# Patient Record
Sex: MALE | Race: WHITE | NOT HISPANIC OR LATINO | URBAN - METROPOLITAN AREA
[De-identification: names, ages, dates, MRNs, and addresses within clinical notes are randomized per-mention and may not be internally consistent; named-entity substitution may affect disease eponyms.]

---

## 2017-11-04 ENCOUNTER — HOSPITAL ENCOUNTER (OUTPATIENT)
Dept: RADIOLOGY | Facility: MEDICAL CENTER | Age: 62
End: 2017-11-04
Attending: INTERNAL MEDICINE
Payer: COMMERCIAL

## 2017-11-04 DIAGNOSIS — I26.99 OTHER ACUTE PULMONARY EMBOLISM WITHOUT ACUTE COR PULMONALE (HCC): ICD-10-CM

## 2017-11-04 DIAGNOSIS — I82.4Y1 DVT, LOWER EXTREMITY, PROXIMAL, ACUTE, RIGHT (HCC): ICD-10-CM

## 2017-11-04 DIAGNOSIS — M79.606 PAIN OF LOWER EXTREMITY, UNSPECIFIED LATERALITY: ICD-10-CM

## 2017-11-04 PROCEDURE — 93971 EXTREMITY STUDY: CPT

## 2017-11-15 ENCOUNTER — HOSPITAL ENCOUNTER (OUTPATIENT)
Dept: LAB | Facility: MEDICAL CENTER | Age: 62
End: 2017-11-15
Attending: INTERNAL MEDICINE
Payer: COMMERCIAL

## 2017-11-15 LAB — DEPRECATED D DIMER PPP IA-ACNC: <200 NG/ML(D-DU)

## 2017-11-15 PROCEDURE — 85379 FIBRIN DEGRADATION QUANT: CPT

## 2017-11-15 PROCEDURE — 36415 COLL VENOUS BLD VENIPUNCTURE: CPT

## 2019-04-26 ENCOUNTER — HOSPITAL ENCOUNTER (INPATIENT)
Facility: MEDICAL CENTER | Age: 64
LOS: 1 days | DRG: 700 | End: 2019-04-28
Attending: EMERGENCY MEDICINE | Admitting: HOSPITALIST
Payer: MEDICARE

## 2019-04-26 ENCOUNTER — HOSPITAL ENCOUNTER (OUTPATIENT)
Dept: RADIOLOGY | Facility: MEDICAL CENTER | Age: 64
End: 2019-04-26

## 2019-04-26 DIAGNOSIS — N28.89 RENAL MASS: ICD-10-CM

## 2019-04-26 PROBLEM — E78.5 HLD (HYPERLIPIDEMIA): Status: ACTIVE | Noted: 2019-04-26

## 2019-04-26 LAB
ANION GAP SERPL CALC-SCNC: 6 MMOL/L (ref 0–11.9)
APPEARANCE UR: CLEAR
APTT PPP: 26.3 SEC (ref 24.7–36)
BASOPHILS # BLD AUTO: 0.2 % (ref 0–1.8)
BASOPHILS # BLD: 0.01 K/UL (ref 0–0.12)
BILIRUB UR QL STRIP.AUTO: NEGATIVE
BUN SERPL-MCNC: 19 MG/DL (ref 8–22)
CALCIUM SERPL-MCNC: 8.8 MG/DL (ref 8.5–10.5)
CHLORIDE SERPL-SCNC: 106 MMOL/L (ref 96–112)
CO2 SERPL-SCNC: 25 MMOL/L (ref 20–33)
COLOR UR: YELLOW
CREAT SERPL-MCNC: 1.1 MG/DL (ref 0.5–1.4)
EOSINOPHIL # BLD AUTO: 0.03 K/UL (ref 0–0.51)
EOSINOPHIL NFR BLD: 0.5 % (ref 0–6.9)
ERYTHROCYTE [DISTWIDTH] IN BLOOD BY AUTOMATED COUNT: 43.9 FL (ref 35.9–50)
GLUCOSE SERPL-MCNC: 120 MG/DL (ref 65–99)
GLUCOSE UR STRIP.AUTO-MCNC: NEGATIVE MG/DL
HCT VFR BLD AUTO: 39.6 % (ref 42–52)
HGB BLD-MCNC: 14 G/DL (ref 14–18)
IMM GRANULOCYTES # BLD AUTO: 0.01 K/UL (ref 0–0.11)
IMM GRANULOCYTES NFR BLD AUTO: 0.2 % (ref 0–0.9)
INR PPP: 1.07 (ref 0.87–1.13)
KETONES UR STRIP.AUTO-MCNC: NEGATIVE MG/DL
LEUKOCYTE ESTERASE UR QL STRIP.AUTO: NEGATIVE
LYMPHOCYTES # BLD AUTO: 0.65 K/UL (ref 1–4.8)
LYMPHOCYTES NFR BLD: 11.5 % (ref 22–41)
MCH RBC QN AUTO: 31.7 PG (ref 27–33)
MCHC RBC AUTO-ENTMCNC: 35.4 G/DL (ref 33.7–35.3)
MCV RBC AUTO: 89.6 FL (ref 81.4–97.8)
MICRO URNS: NORMAL
MONOCYTES # BLD AUTO: 0.58 K/UL (ref 0–0.85)
MONOCYTES NFR BLD AUTO: 10.3 % (ref 0–13.4)
NEUTROPHILS # BLD AUTO: 4.35 K/UL (ref 1.82–7.42)
NEUTROPHILS NFR BLD: 77.3 % (ref 44–72)
NITRITE UR QL STRIP.AUTO: NEGATIVE
NRBC # BLD AUTO: 0 K/UL
NRBC BLD-RTO: 0 /100 WBC
PH UR STRIP.AUTO: 7.5 [PH]
PLATELET # BLD AUTO: 93 K/UL (ref 164–446)
PMV BLD AUTO: 10.9 FL (ref 9–12.9)
POTASSIUM SERPL-SCNC: 3.8 MMOL/L (ref 3.6–5.5)
PROT UR QL STRIP: NEGATIVE MG/DL
PROTHROMBIN TIME: 14.1 SEC (ref 12–14.6)
RBC # BLD AUTO: 4.42 M/UL (ref 4.7–6.1)
RBC UR QL AUTO: NEGATIVE
SODIUM SERPL-SCNC: 137 MMOL/L (ref 135–145)
SP GR UR STRIP.AUTO: 1.02
UROBILINOGEN UR STRIP.AUTO-MCNC: 1 MG/DL
WBC # BLD AUTO: 5.6 K/UL (ref 4.8–10.8)

## 2019-04-26 PROCEDURE — 99220 PR INITIAL OBSERVATION CARE,LEVL III: CPT | Performed by: HOSPITALIST

## 2019-04-26 PROCEDURE — G0378 HOSPITAL OBSERVATION PER HR: HCPCS

## 2019-04-26 PROCEDURE — 94760 N-INVAS EAR/PLS OXIMETRY 1: CPT

## 2019-04-26 PROCEDURE — 99285 EMERGENCY DEPT VISIT HI MDM: CPT

## 2019-04-26 PROCEDURE — 85730 THROMBOPLASTIN TIME PARTIAL: CPT

## 2019-04-26 PROCEDURE — A9270 NON-COVERED ITEM OR SERVICE: HCPCS | Performed by: HOSPITALIST

## 2019-04-26 PROCEDURE — 700105 HCHG RX REV CODE 258: Performed by: HOSPITALIST

## 2019-04-26 PROCEDURE — 700111 HCHG RX REV CODE 636 W/ 250 OVERRIDE (IP): Performed by: HOSPITALIST

## 2019-04-26 PROCEDURE — 81003 URINALYSIS AUTO W/O SCOPE: CPT

## 2019-04-26 PROCEDURE — 85025 COMPLETE CBC W/AUTO DIFF WBC: CPT

## 2019-04-26 PROCEDURE — 85610 PROTHROMBIN TIME: CPT

## 2019-04-26 PROCEDURE — 700102 HCHG RX REV CODE 250 W/ 637 OVERRIDE(OP): Performed by: HOSPITALIST

## 2019-04-26 PROCEDURE — 96374 THER/PROPH/DIAG INJ IV PUSH: CPT

## 2019-04-26 PROCEDURE — 80048 BASIC METABOLIC PNL TOTAL CA: CPT

## 2019-04-26 RX ORDER — ALLOPURINOL 300 MG/1
300 TABLET ORAL EVERY MORNING
Status: DISCONTINUED | OUTPATIENT
Start: 2019-04-27 | End: 2019-04-28 | Stop reason: HOSPADM

## 2019-04-26 RX ORDER — MORPHINE SULFATE 4 MG/ML
4 INJECTION, SOLUTION INTRAMUSCULAR; INTRAVENOUS
Status: DISCONTINUED | OUTPATIENT
Start: 2019-04-26 | End: 2019-04-26

## 2019-04-26 RX ORDER — BISACODYL 10 MG
10 SUPPOSITORY, RECTAL RECTAL
Status: DISCONTINUED | OUTPATIENT
Start: 2019-04-26 | End: 2019-04-28 | Stop reason: HOSPADM

## 2019-04-26 RX ORDER — ALLOPURINOL 300 MG/1
300 TABLET ORAL EVERY MORNING
COMMUNITY
Start: 2017-06-06

## 2019-04-26 RX ORDER — SODIUM CHLORIDE 9 MG/ML
INJECTION, SOLUTION INTRAVENOUS CONTINUOUS
Status: DISCONTINUED | OUTPATIENT
Start: 2019-04-26 | End: 2019-04-28 | Stop reason: HOSPADM

## 2019-04-26 RX ORDER — ONDANSETRON 4 MG/1
4 TABLET, ORALLY DISINTEGRATING ORAL EVERY 4 HOURS PRN
Status: DISCONTINUED | OUTPATIENT
Start: 2019-04-26 | End: 2019-04-28 | Stop reason: HOSPADM

## 2019-04-26 RX ORDER — OXYCODONE HYDROCHLORIDE 10 MG/1
10 TABLET ORAL
Status: DISCONTINUED | OUTPATIENT
Start: 2019-04-26 | End: 2019-04-28 | Stop reason: HOSPADM

## 2019-04-26 RX ORDER — ONDANSETRON 2 MG/ML
4 INJECTION INTRAMUSCULAR; INTRAVENOUS EVERY 4 HOURS PRN
Status: DISCONTINUED | OUTPATIENT
Start: 2019-04-26 | End: 2019-04-28 | Stop reason: HOSPADM

## 2019-04-26 RX ORDER — OXYCODONE HYDROCHLORIDE 5 MG/1
5 TABLET ORAL
Status: DISCONTINUED | OUTPATIENT
Start: 2019-04-26 | End: 2019-04-28 | Stop reason: HOSPADM

## 2019-04-26 RX ORDER — SIMVASTATIN 10 MG
20 TABLET ORAL EVERY MORNING
Status: DISCONTINUED | OUTPATIENT
Start: 2019-04-27 | End: 2019-04-28 | Stop reason: HOSPADM

## 2019-04-26 RX ORDER — SIMVASTATIN 20 MG
20 TABLET ORAL EVERY MORNING
COMMUNITY
Start: 2017-06-10

## 2019-04-26 RX ORDER — AMOXICILLIN 250 MG
2 CAPSULE ORAL 2 TIMES DAILY
Status: DISCONTINUED | OUTPATIENT
Start: 2019-04-26 | End: 2019-04-28 | Stop reason: HOSPADM

## 2019-04-26 RX ORDER — POLYETHYLENE GLYCOL 3350 17 G/17G
1 POWDER, FOR SOLUTION ORAL
Status: DISCONTINUED | OUTPATIENT
Start: 2019-04-26 | End: 2019-04-28 | Stop reason: HOSPADM

## 2019-04-26 RX ADMIN — OXYCODONE HYDROCHLORIDE 5 MG: 5 TABLET ORAL at 23:02

## 2019-04-26 RX ADMIN — FENTANYL CITRATE 50 MCG: 50 INJECTION, SOLUTION INTRAMUSCULAR; INTRAVENOUS at 21:02

## 2019-04-26 RX ADMIN — SODIUM CHLORIDE: 9 INJECTION, SOLUTION INTRAVENOUS at 23:02

## 2019-04-26 ASSESSMENT — ENCOUNTER SYMPTOMS
MYALGIAS: 0
HALLUCINATIONS: 0
WEAKNESS: 0
BRUISES/BLEEDS EASILY: 0
ABDOMINAL PAIN: 0
HEMOPTYSIS: 0
FEVER: 0
HEARTBURN: 0
PALPITATIONS: 0
DOUBLE VISION: 0
FOCAL WEAKNESS: 0
SENSORY CHANGE: 0
DEPRESSION: 0
SHORTNESS OF BREATH: 0
FLANK PAIN: 1
EYE DISCHARGE: 0
NAUSEA: 0
COUGH: 0
BLURRED VISION: 0
SPEECH CHANGE: 0
CHILLS: 0
DIZZINESS: 0
VOMITING: 0

## 2019-04-26 ASSESSMENT — LIFESTYLE VARIABLES
HAVE YOU EVER FELT YOU SHOULD CUT DOWN ON YOUR DRINKING: NO
TOTAL SCORE: 0
CONSUMPTION TOTAL: NEGATIVE
TOTAL SCORE: 0
EVER HAD A DRINK FIRST THING IN THE MORNING TO STEADY YOUR NERVES TO GET RID OF A HANGOVER: NO
ALCOHOL_USE: YES
ON A TYPICAL DAY WHEN YOU DRINK ALCOHOL HOW MANY DRINKS DO YOU HAVE: 1
AVERAGE NUMBER OF DAYS PER WEEK YOU HAVE A DRINK CONTAINING ALCOHOL: 5
EVER_SMOKED: NEVER
EVER FELT BAD OR GUILTY ABOUT YOUR DRINKING: NO
HOW MANY TIMES IN THE PAST YEAR HAVE YOU HAD 5 OR MORE DRINKS IN A DAY: 0
SUBSTANCE_ABUSE: 0
HAVE PEOPLE ANNOYED YOU BY CRITICIZING YOUR DRINKING: NO
TOTAL SCORE: 0

## 2019-04-26 ASSESSMENT — PATIENT HEALTH QUESTIONNAIRE - PHQ9
2. FEELING DOWN, DEPRESSED, IRRITABLE, OR HOPELESS: NOT AT ALL
SUM OF ALL RESPONSES TO PHQ9 QUESTIONS 1 AND 2: 0
1. LITTLE INTEREST OR PLEASURE IN DOING THINGS: NOT AT ALL

## 2019-04-26 ASSESSMENT — COGNITIVE AND FUNCTIONAL STATUS - GENERAL
MOBILITY SCORE: 24
SUGGESTED CMS G CODE MODIFIER MOBILITY: CH
DAILY ACTIVITIY SCORE: 24
SUGGESTED CMS G CODE MODIFIER DAILY ACTIVITY: CH

## 2019-04-26 ASSESSMENT — PAIN SCALES - WONG BAKER: WONGBAKER_NUMERICALRESPONSE: HURTS A WHOLE LOT

## 2019-04-27 ENCOUNTER — APPOINTMENT (OUTPATIENT)
Dept: RADIOLOGY | Facility: MEDICAL CENTER | Age: 64
DRG: 700 | End: 2019-04-27
Attending: UROLOGY
Payer: MEDICARE

## 2019-04-27 PROBLEM — N18.9 CKD (CHRONIC KIDNEY DISEASE): Status: ACTIVE | Noted: 2019-04-27

## 2019-04-27 LAB
ALBUMIN SERPL BCP-MCNC: 3.3 G/DL (ref 3.2–4.9)
ALBUMIN/GLOB SERPL: 1.8 G/DL
ALP SERPL-CCNC: 58 U/L (ref 30–99)
ALT SERPL-CCNC: 8 U/L (ref 2–50)
ANION GAP SERPL CALC-SCNC: 6 MMOL/L (ref 0–11.9)
AST SERPL-CCNC: 9 U/L (ref 12–45)
BASOPHILS # BLD AUTO: 0.3 % (ref 0–1.8)
BASOPHILS # BLD: 0.02 K/UL (ref 0–0.12)
BILIRUB SERPL-MCNC: 1.5 MG/DL (ref 0.1–1.5)
BUN SERPL-MCNC: 18 MG/DL (ref 8–22)
CALCIUM SERPL-MCNC: 8.5 MG/DL (ref 8.5–10.5)
CHLORIDE SERPL-SCNC: 107 MMOL/L (ref 96–112)
CO2 SERPL-SCNC: 25 MMOL/L (ref 20–33)
CREAT SERPL-MCNC: 1.13 MG/DL (ref 0.5–1.4)
EOSINOPHIL # BLD AUTO: 0.05 K/UL (ref 0–0.51)
EOSINOPHIL NFR BLD: 0.9 % (ref 0–6.9)
ERYTHROCYTE [DISTWIDTH] IN BLOOD BY AUTOMATED COUNT: 44 FL (ref 35.9–50)
GLOBULIN SER CALC-MCNC: 1.8 G/DL (ref 1.9–3.5)
GLUCOSE SERPL-MCNC: 122 MG/DL (ref 65–99)
HCT VFR BLD AUTO: 38.4 % (ref 42–52)
HGB BLD-MCNC: 13.1 G/DL (ref 14–18)
IMM GRANULOCYTES # BLD AUTO: 0 K/UL (ref 0–0.11)
IMM GRANULOCYTES NFR BLD AUTO: 0 % (ref 0–0.9)
LYMPHOCYTES # BLD AUTO: 1.16 K/UL (ref 1–4.8)
LYMPHOCYTES NFR BLD: 19.7 % (ref 22–41)
MCH RBC QN AUTO: 30.5 PG (ref 27–33)
MCHC RBC AUTO-ENTMCNC: 34.1 G/DL (ref 33.7–35.3)
MCV RBC AUTO: 89.5 FL (ref 81.4–97.8)
MONOCYTES # BLD AUTO: 0.73 K/UL (ref 0–0.85)
MONOCYTES NFR BLD AUTO: 12.4 % (ref 0–13.4)
NEUTROPHILS # BLD AUTO: 3.92 K/UL (ref 1.82–7.42)
NEUTROPHILS NFR BLD: 66.7 % (ref 44–72)
NRBC # BLD AUTO: 0 K/UL
NRBC BLD-RTO: 0 /100 WBC
PLATELET # BLD AUTO: 97 K/UL (ref 164–446)
PMV BLD AUTO: 9.6 FL (ref 9–12.9)
POTASSIUM SERPL-SCNC: 4 MMOL/L (ref 3.6–5.5)
PROT SERPL-MCNC: 5.1 G/DL (ref 6–8.2)
RBC # BLD AUTO: 4.29 M/UL (ref 4.7–6.1)
SODIUM SERPL-SCNC: 138 MMOL/L (ref 135–145)
WBC # BLD AUTO: 5.9 K/UL (ref 4.8–10.8)

## 2019-04-27 PROCEDURE — 71250 CT THORAX DX C-: CPT

## 2019-04-27 PROCEDURE — 36415 COLL VENOUS BLD VENIPUNCTURE: CPT

## 2019-04-27 PROCEDURE — 700111 HCHG RX REV CODE 636 W/ 250 OVERRIDE (IP): Performed by: HOSPITALIST

## 2019-04-27 PROCEDURE — 96376 TX/PRO/DX INJ SAME DRUG ADON: CPT

## 2019-04-27 PROCEDURE — 700102 HCHG RX REV CODE 250 W/ 637 OVERRIDE(OP): Performed by: HOSPITALIST

## 2019-04-27 PROCEDURE — 700105 HCHG RX REV CODE 258: Performed by: HOSPITALIST

## 2019-04-27 PROCEDURE — G0378 HOSPITAL OBSERVATION PER HR: HCPCS

## 2019-04-27 PROCEDURE — 770006 HCHG ROOM/CARE - MED/SURG/GYN SEMI*

## 2019-04-27 PROCEDURE — 87040 BLOOD CULTURE FOR BACTERIA: CPT

## 2019-04-27 PROCEDURE — 80053 COMPREHEN METABOLIC PANEL: CPT

## 2019-04-27 PROCEDURE — 85025 COMPLETE CBC W/AUTO DIFF WBC: CPT

## 2019-04-27 PROCEDURE — 99232 SBSQ HOSP IP/OBS MODERATE 35: CPT | Performed by: HOSPITALIST

## 2019-04-27 PROCEDURE — A9270 NON-COVERED ITEM OR SERVICE: HCPCS | Performed by: HOSPITALIST

## 2019-04-27 RX ORDER — ACETAMINOPHEN 325 MG/1
650 TABLET ORAL EVERY 4 HOURS PRN
Status: DISCONTINUED | OUTPATIENT
Start: 2019-04-27 | End: 2019-04-28 | Stop reason: HOSPADM

## 2019-04-27 RX ADMIN — OXYCODONE HYDROCHLORIDE 5 MG: 5 TABLET ORAL at 08:22

## 2019-04-27 RX ADMIN — SENNOSIDES,DOCUSATE SODIUM 2 TABLET: 8.6; 5 TABLET, FILM COATED ORAL at 08:22

## 2019-04-27 RX ADMIN — FENTANYL CITRATE 50 MCG: 50 INJECTION, SOLUTION INTRAMUSCULAR; INTRAVENOUS at 04:30

## 2019-04-27 RX ADMIN — FENTANYL CITRATE 50 MCG: 50 INJECTION, SOLUTION INTRAMUSCULAR; INTRAVENOUS at 00:46

## 2019-04-27 RX ADMIN — ALLOPURINOL 300 MG: 300 TABLET ORAL at 08:23

## 2019-04-27 RX ADMIN — SENNOSIDES,DOCUSATE SODIUM 2 TABLET: 8.6; 5 TABLET, FILM COATED ORAL at 18:06

## 2019-04-27 RX ADMIN — ACETAMINOPHEN 650 MG: 325 TABLET, FILM COATED ORAL at 18:06

## 2019-04-27 RX ADMIN — OXYCODONE HYDROCHLORIDE 5 MG: 5 TABLET ORAL at 18:06

## 2019-04-27 RX ADMIN — SODIUM CHLORIDE: 9 INJECTION, SOLUTION INTRAVENOUS at 08:22

## 2019-04-27 RX ADMIN — SIMVASTATIN 20 MG: 10 TABLET, FILM COATED ORAL at 08:23

## 2019-04-27 ASSESSMENT — ENCOUNTER SYMPTOMS
ABDOMINAL PAIN: 0
NAUSEA: 0
PSYCHIATRIC NEGATIVE: 1
SHORTNESS OF BREATH: 0
CHILLS: 0
NERVOUS/ANXIOUS: 0
FLANK PAIN: 1
LOSS OF CONSCIOUSNESS: 0
FEVER: 0
PALPITATIONS: 0
DIZZINESS: 0
VOMITING: 0
HEADACHES: 0
FALLS: 0
MYALGIAS: 0
COUGH: 0

## 2019-04-27 NOTE — H&P
Hospital Medicine History & Physical Note    Date of Service  4/26/2019    Primary Care Physician  Eleazar Murphy M.D.    Consultants  urology    Code Status  full    Chief Complaint  full    History of Presenting Illness  64 y.o. male who presented 4/26/2019 with left-sided flank pain.  This patient does have a past medical history of sarcoidosis gout and hyperlipidemia.  This patient's had significant left-sided flank pain radiating into his groin.  Started several days ago he thought initially was a kidney stone.  He did no hematuria.  However pain has progressively worsened.  At the outside hospital he is found to have a hemorrhagic mass on his kidney and transferred to [facility] for further evaluation.  He denies any nausea vomiting fevers chills sweats.  He will be admitted to the hospital with urology consultation and will likely need oncology consultation.    Upon review of outside hospital records there is a large expansile heterogeneous renal mass demonstrated in the left kidney measuring 11.2 x 10 x 10 cm with prominent surrounding perinephric hemorrhage of mixed density likely a reflection of tumor hemorrhage versus unlikely angiomyolipoma with hemorrhage.  Lactic acid 1.7 lipase 25 sodium 132 potassium 3.6 chloride 101 CO2 22 calcium 8.8 BUN 24 creatinine 1.15 glucose 145 albumin 3.8 total bilirubin 1.4 alkaline phosphatase 63 LFTs otherwise unremarkable WBC count 5.9 hemoglobin 14.5 platelet count 102      Review of Systems  Review of Systems   Constitutional: Negative for chills and fever.   HENT: Negative for congestion, hearing loss and tinnitus.    Eyes: Negative for blurred vision, double vision and discharge.   Respiratory: Negative for cough, hemoptysis and shortness of breath.    Cardiovascular: Negative for chest pain, palpitations and leg swelling.   Gastrointestinal: Negative for abdominal pain, heartburn, nausea and vomiting.   Genitourinary: Positive for flank pain and hematuria. Negative  for dysuria.   Musculoskeletal: Negative for joint pain and myalgias.   Skin: Negative for rash.   Neurological: Negative for dizziness, sensory change, speech change, focal weakness and weakness.   Endo/Heme/Allergies: Negative for environmental allergies. Does not bruise/bleed easily.   Psychiatric/Behavioral: Negative for depression, hallucinations and substance abuse.       Past Medical History  Sarcoidosis, gout, hld    Surgical History  Reviewed and not pertiennt      Family History  Reviewed and not pertinent     Social History   reports that he has never smoked. He has never used smokeless tobacco.    Allergies  Allergies   Allergen Reactions   • Penicillin G        Medications  None       Physical Exam  Temp:  [36.4 °C (97.5 °F)] 36.4 °C (97.5 °F)  Pulse:  [89] 89  Resp:  [16-18] 18  SpO2:  [96 %] 96 %    Physical Exam    Laboratory:          Recent Labs      04/26/19 2020   GLUCOSE  120*     Recent Labs      04/26/19 2020   WBC  5.6   RBC  4.42*   HEMOGLOBIN  14.0   HEMATOCRIT  39.6*   MCV  89.6   MCH  31.7   RDW  43.9   PLATELETCT  93*   MPV  10.9   NEUTSPOLYS  77.30*   LYMPHOCYTES  11.50*   MONOCYTES  10.30   EOSINOPHILS  0.50   BASOPHILS  0.20                   No results for input(s): TROPONINI in the last 72 hours.    Urinalysis:    No results found     Imaging:  OUTSIDE IMAGES-CT ABDOMEN /PELVIS   Final Result            Assessment/Plan:  I anticipate this patient is appropriate for observation status at this time.    * Renal mass   Assessment & Plan    Hemorrhagic mass on the left kidney   NPO at midnight, urology has been consulted for evaluation   UA ordered  May need biopsy will defer to urology after evaluation      HLD (hyperlipidemia)   Assessment & Plan    Resume statin therapy      Gout, unspecified- (present on admission)   Assessment & Plan    Resume home allopurinol      Thrombocytopenia (HCC)- (present on admission)   Assessment & Plan    Chronic per patient   Check teg with platelet  mapping   Con to monitor      Sarcoidosis- (present on admission)   Assessment & Plan    History of?         VTE prophylaxis: scd

## 2019-04-27 NOTE — PROGRESS NOTES
[facility] Medicine Daily Progress Note    Date of Service  4/27/2019    Chief Complaint  64 y.o. male with history of sarcoidosis admitted 4/26/2019 with left flank pain for the last 2 to 3 days.    Interval Problem Update  Doing well, wife at the bedside.  Patient is extremely active and eager to move forward with nephrectomy.  No complaints of pain, urinating well.    Consultants/Specialty  Urology    Code Status  Full    Disposition  Anticipate home with outpatient follow-up.    Review of Systems  Review of Systems   Constitutional: Negative for chills, fever and malaise/fatigue.   Respiratory: Negative for cough and shortness of breath.    Cardiovascular: Negative for chest pain, palpitations and leg swelling.   Gastrointestinal: Negative for abdominal pain, nausea and vomiting.   Genitourinary: Positive for flank pain (left). Negative for dysuria and hematuria.   Musculoskeletal: Negative for falls and myalgias.   Neurological: Negative for dizziness, loss of consciousness and headaches.   Psychiatric/Behavioral: Negative.  The patient is not nervous/anxious.    All other systems reviewed and are negative.       Physical Exam  Temp:  [36.4 °C (97.5 °F)-37.6 °C (99.6 °F)] 37.4 °C (99.3 °F)  Pulse:  [83-93] 87  Resp:  [16-20] 20  BP: (134-155)/(82-84) 134/82  SpO2:  [92 %-96 %] 92 %    Physical Exam   Constitutional: He is oriented to person, place, and time. He appears well-developed. No distress.   HENT:   Head: Normocephalic.   Mouth/Throat: Oropharynx is clear and moist.   Eyes: EOM are normal. No scleral icterus.   Neck: Normal range of motion. No tracheal deviation present.   Cardiovascular: Normal rate, regular rhythm and intact distal pulses.    Pulmonary/Chest: Effort normal and breath sounds normal. No respiratory distress. He has no rales.   Abdominal: Soft. Bowel sounds are normal. He exhibits no distension. There is no tenderness. There is no guarding.   Musculoskeletal: He exhibits no edema or  tenderness.   Neurological: He is alert and oriented to person, place, and time.   Skin: Skin is warm and dry. He is not diaphoretic. No erythema.   Psychiatric: He has a normal mood and affect. His speech is normal and behavior is normal.   Vitals reviewed.      Fluids    Intake/Output Summary (Last 24 hours) at 04/27/19 0738  Last data filed at 04/26/19 2302   Gross per 24 hour   Intake              100 ml   Output              150 ml   Net              -50 ml       Laboratory  Recent Labs      04/26/19 2020 04/27/19 0333   WBC  5.6  5.9   RBC  4.42*  4.29*   HEMOGLOBIN  14.0  13.1*   HEMATOCRIT  39.6*  38.4*   MCV  89.6  89.5   MCH  31.7  30.5   MCHC  35.4*  34.1   RDW  43.9  44.0   PLATELETCT  93*  97*   MPV  10.9  9.6     Recent Labs      04/26/19 2020 04/27/19 0333   SODIUM  137  138   POTASSIUM  3.8  4.0   CHLORIDE  106  107   CO2  25  25   GLUCOSE  120*  122*   BUN  19  18   CREATININE  1.10  1.13   CALCIUM  8.8  8.5     Recent Labs      04/26/19 2020   APTT  26.3   INR  1.07               Imaging  OUTSIDE IMAGES-CT ABDOMEN /PELVIS   Final Result      CT-CHEST (THORAX) W/O    (Results Pending)        Assessment/Plan  * Renal mass- (present on admission)   Assessment & Plan    Suspect renal cell carcinoma.  Patient was found to have hemorrhagic mass of the left kidney.  Urinalysis negative.  -Urology following, greatly appreciate   -Discharge home with follow-up at academic center?  Versus transfer to another center?    -No plan for surgery at this time, will resume diet  -CT chest pending  -Will need outpatient PET scan     Sarcoidosis- (present on admission)   Assessment & Plan    Reports history of flare that affected his kidney's, required prednisone at that time.  - not currently on any medications  - outpatient follow up     CKD (chronic kidney disease)- (present on admission)   Assessment & Plan    Query CKD stage II?  Patient reports history of sarcoidosis that affected his kidneys,  creatinine at 1.1, baseline unknown.  -Renally dose medications  -Avoid nephrotoxic agents  -Patient will need a left nephrectomy in the near future     HLD (hyperlipidemia)- (present on admission)   Assessment & Plan    Continue home simvastatin     Gout, unspecified- (present on admission)   Assessment & Plan    Resume home allopurinol      Thrombocytopenia (HCC)- (present on admission)   Assessment & Plan    Chronic per patient. No active bleeding at this time  - TEG and plt mapping pending  - trend          VTE prophylaxis: SCDs

## 2019-04-27 NOTE — CONSULTS
DATE OF SERVICE:  04/27/2019    REASON FOR CONSULTATION:  Left renal mass.    HISTORY OF PRESENT ILLNESS:  The patient is a 64-year-old gentleman who   presented yesterday with left flank pain.  This had been going on for 2-3   days.  With progressive pain, he presented to the emergency room.  Imaging   identified an 11 cm left lower pole mass with perinephric hemorrhage.  He has   a prior renal history with sarcoidosis involving the kidney with at that time   diminishment of renal function.  He was treated with high-dose steroids with   resolution of this many years ago.  He has not had any associated hematuria   recently.  He has not had weight loss, night sweats, or other systemic   symptoms.    PAST MEDICAL HISTORY:  Sarcoidosis and gout.    PAST SURGICAL HISTORY:  Cholecystectomy and inguinal hernia repair.    ALLERGIES:  PENICILLIN.    PHYSICAL EXAMINATION:  GENERAL:  He is awake, alert, in no distress.  He appears to be in good   health.  ABDOMEN:  Shows fullness in the left upper quadrant with mass that is not   easily mobile.  He is tender in the left upper quadrant and left flank.  GENITOURINARY:  Shows normal penis and testicles.    LABORATORY DATA:  Hematocrit 39.6 on admission to 38.5 today.  Liver function   test is normal including normal alkaline phosphatase.    IMAGING:  CT scan with large left lower pole renal mass consistent with renal   cell carcinoma, perinephric hemorrhage with anatomical distortion and   displacement of the kidney.    ASSESSMENT AND PLAN:  Probable stage T2/T3 left renal cell carcinoma.  We will   plan for chest imaging to see for a lung metastases.  He does not seem to   have any liver or bone metastases.  Given his alkaline phosphatase is normal   and I think he needs a bone scan.  Given his history of renal insufficiency   previously, we will limit contrast and nephrotoxins agents.  We will plan to   do the CT scan of the chest without contrast.  Ultimately, he will need a    nephrectomy.  This would need to be done open approach.  I have discussed with   him the options of trying to do this here if we were able to accomplish that   in the near future versus transfer to academic center given anticipated   difficulties with the surgery.       ____________________________________     MD MIKHAIL Hess / JU    DD:  04/27/2019 07:09:06  DT:  04/27/2019 10:31:48    D#:  [id number]  Job#:  [id number]

## 2019-04-27 NOTE — ASSESSMENT & PLAN NOTE
Query CKD stage II?  Patient reports history of sarcoidosis that affected his kidneys, creatinine at 1.1, baseline unknown.  -Renally dose medications  -Avoid nephrotoxic agents  -Patient will need a left nephrectomy in the near future

## 2019-04-27 NOTE — CARE PLAN
Problem: Communication  Goal: The ability to communicate needs accurately and effectively will improve  Outcome: PROGRESSING AS EXPECTED  Patient oriented to room, lab times, and change of shifts.  All questions answered at this time    Problem: Pain Management  Goal: Pain level will decrease to patient's comfort goal  Outcome: PROGRESSING AS EXPECTED  Pain is well controlled per medication per MAR

## 2019-04-27 NOTE — ED NOTES
"Pt brought in by EMS, transfer from [facility]. Pt c/o L flank pain that radiates to LLQ since yesterday. Also c/o hematuria. Per EMS pt went to clinic yesterday and was dx with possible kidney stone (no CT was done). Pt was given flomax on d/c. EMS states pt was told to have CT scan done so he went to [facility], CT scan shows lesion to L kidney, also states \"moderate to large volume perinephric hemorrhage.\"     Pt a/o x4, speaking in full sentences.   "

## 2019-04-27 NOTE — PROGRESS NOTES
Presents with large left renal mass with perinephric bleed. Not actively bleeding. Has ongoing discomfort, controlled. Will not be doing nephrectomy this weekend so can start diet. Will get noncontrast CT of chest to assess for lung lesions. If +, will consult with oncology. Otherwise will need open nephrectomy in near future. No biopsy. Full not dictated.

## 2019-04-27 NOTE — ASSESSMENT & PLAN NOTE
Reports history of flare that affected his kidney's, required prednisone at that time.  - not currently on any medications  - outpatient follow up

## 2019-04-27 NOTE — ED PROVIDER NOTES
"ED Provider Note    Scribed for Gianluca Buckley M.D. by Gianluca Buckley. 4/26/2019,  7:56 PM.    CHIEF COMPLAINT  Chief Complaint   Patient presents with   • Flank Pain   • Blood in Urine       HPI  Hemant Martínez is a 64 y.o. male who presents to the Emergency Department as a transfer from [facility], where he presented earlier today for left flank pain, radiating to the left lower quadrant.  The symptoms started yesterday.  He did note hematuria at home.  He was seen in clinic yesterday, and was diagnosed with a possible kidney stone clinically.  He was discharged on Flomax.  He was instructed to have a CT scan done, went to [facility], where a CT scan today showed a perinephric hemorrhage.  The official impression of his CT scan reads \"large expansile heterogeneous renal mass demonstrated in the inferior left kidney measuring 11.2 x 9.9 x 10.6 cm with prominent surrounding perinephric hemorrhage of mixed density.  Likely a reflection of tumor hemorrhage versus unlikely an angiomyolipoma with hemorrhage.\"  He describes the left flank and left lower quadrant pain as constant, with waxing and waning episodes.  Pain is worse with palpation or certain movements, both of which cause sharp pain.  Pain is still present but mild when lying still.  He denies vomiting.  He does report some nausea, reports some chills, but no fevers.  His only abdominal history is a remote cholecystectomy.  He does not have a kidney stone history or kidney disease history.  He is not on blood thinners.    His records show that he received a bolus of lactated Ringer's, and laboratory tests are unremarkable lactic acid, unremarkable lipase, slight creatinine elevation at 1.1 glucose of 145, total bilirubin of 1.4, slightly elevated, and unremarkable CBC and differential, and no evidence of urinary tract infection.    He reports a remote history of sarcoidosis.  He says he thinks he was told that it was causing some " "renal cysts.  He has no history of cancer.    REVIEW OF SYSTEMS  See HPI for further details. All other systems are negative.     PAST MEDICAL HISTORY   Remote history of sarcoidosis.    SOCIAL HISTORY  Social History     Social History Main Topics   • Smoking status: Never Smoker   • Smokeless tobacco: Never Used   • Alcohol use Not on file   • Drug use: Unknown   • Sexual activity: Not on file     History   Drug use: Unknown       SURGICAL HISTORY  patient denies any surgical history    CURRENT MEDICATIONS  Home Medications     Reviewed by Umm Wright R.N. (Registered Nurse) on 04/26/19 at 1910  Med List Status: Partial   Medication Last Dose Status        Patient Adan Taking any Medications                       ALLERGIES  Allergies   Allergen Reactions   • Penicillin G        PHYSICAL EXAM  VITAL SIGNS: Pulse 89   Temp 36.4 °C (97.5 °F) (Temporal)   Resp 18   Ht 1.854 m (6' 1\")   Wt 84.8 kg (187 lb)   SpO2 96%   BMI 24.67 kg/m²   Pulse ox interpretation: I interpret this pulse ox as normal.  Constitutional: Alert in no apparent distress.  HENT: No signs of trauma, Bilateral external ears normal, Nose normal.   Eyes: Conjunctiva normal, Non-icteric.   Neck: Normal range of motion, Supple, No stridor.   Lymphatic: No lymphadenopathy noted.   Cardiovascular: Regular rate and rhythm, no murmurs.   Thorax & Lungs: Normal breath sounds, No respiratory distress, No wheezing, No chest tenderness.   Abdomen: Bowel sounds normal, Soft, significant left lower quadrant and left flank tenderness, No masses, No pulsatile masses. No peritoneal signs.  Skin: Warm, Dry, No erythema, No rash.   Back: Left CVA tenderness.  Extremities: Intact distal pulses, No edema, No cyanosis.  Musculoskeletal: Good range of motion in all major joints. No or major deformities noted.   Neurologic: Alert , Normal motor function, Normal sensory function, No focal deficits noted.   Psychiatric: Affect normal, Judgment normal, Mood " normal.     DIAGNOSTIC STUDIES / PROCEDURES    LABS  Labs Reviewed   URINALYSIS,CULTURE IF INDICATED   CBC WITH DIFFERENTIAL   BASIC METABOLIC PANEL   PROTHROMBIN TIME   APTT     All labs reviewed by me.    RADIOLOGY  OUTSIDE IMAGES-CT ABDOMEN /PELVIS   Final Result        The outpatient radiologist's interpretation of all radiological studies have been reviewed by me.    COURSE & MEDICAL DECISION MAKING  Nursing notes, VS, PMSFHx reviewed in chart.     7:56 PM Patient seen and examined at bedside.  He has a known hemorrhagic mass in his left kidney that may be malignant.  I have spoken with urology, and they would like the patient to be rested overnight and they will evaluate hospitalist.  I repeated basic lab tests.  I will repeat lab tests and order some fentanyl for pain.    8:24 PM Dr. Silver agrees to admit.    DISPOSITION:  Patient will be admitted to Dr. Silver in stable condition.      FINAL IMPRESSION  1. Hemorrhagic left renal mass

## 2019-04-27 NOTE — PROGRESS NOTES
Bedside report received.  Assessment complete.  A&O x 4. Patient calls appropriately.  Patient up with no assist.    Patient has 7/10 pain. Medicated per MAR.  Denies N&V. Tolerating regular diet.  positive void, positive flatus, no BM.  Patient denies SOB.  Patient sitting up in bed.  Review plan with of care with patient. Call light and personal belongings with in reach. Hourly rounding in place. All needs met at this time.

## 2019-04-27 NOTE — ASSESSMENT & PLAN NOTE
Suspect renal cell carcinoma.  Patient was found to have hemorrhagic mass of the left kidney.  Urinalysis negative.  -Urology following, greatly appreciate   -Discharge home with follow-up at academic center?  Versus transfer to another center?    -No plan for surgery at this time, will resume diet  -CT chest pending  -Will need outpatient PET scan

## 2019-04-27 NOTE — DISCHARGE PLANNING
Care Transition Team Assessment    Per patient wife will provide ride home upon discharge. No needs identified at this time.    Information Source  Orientation : Oriented x 4  Information Given By: Patient  Informant's Name: Hemant  Who is responsible for making decisions for patient? : Patient         Elopement Risk  Legal Hold: No  Ambulatory or Self Mobile in Wheelchair: Yes  Disoriented: No  Psychiatric Symptoms: None  History of Wandering: No  Elopement this Admit: No  Vocalizing Wanting to Leave: No  Displays Behaviors, Body Language Wanting to Leave: No-Not at Risk for Elopement    Interdisciplinary Discharge Planning  Does Admitting Nurse Feel This Could be a Complex Discharge?: No  Primary Care Physician: Eleazar Murphy  Lives with - Patient's Self Care Capacity: Spouse  Patient or legal guardian wants to designate a caregiver (see row info): Yes  Caregiver name: Westley Martínez   Caregiver relationship to patient: spouse  Caregiver contact info: [phone]  Support Systems: Family Member(s)  Housing / Facility: [address] [place] [address]  Do You Take your Prescribed Medications Regularly: Yes  Able to Return to Previous ADL's: Yes  Mobility Issues: No  Prior Services: None  Patient Expects to be Discharged to:: home  Assistance Needed: No  Durable Medical Equipment: Not Applicable    Discharge Preparedness  What is your plan after discharge?: Other (comment) (Home independent)  What are your discharge supports?: Spouse  Prior Functional Level: Drives Self, Independent with Activities of Daily Living, Independent with Medication Management  Difficulity with ADLs: None    Functional Assesment  Prior Functional Level: Drives Self, Independent with Activities of Daily Living, Independent with Medication Management    Finances  Financial Barriers to Discharge: No  Prescription Coverage: Yes    Vision / Hearing Impairment  Vision Impairment : No  Hearing Impairment : No         Advance Directive  Advance Directive?: Living  [facility] [place] for [facility]  Durable Power of  Name and Contact : Can't remember who he put as  DPOA    Domestic Abuse  Have you ever been the victim of abuse or violence?: No  Physical Abuse or Sexual Abuse: No  Verbal Abuse or Emotional Abuse: No  Possible Abuse Reported to:: Not Applicable    Psychological Assessment  History of Substance Abuse: None  History of Psychiatric Problems: No    Discharge Risks or Barriers  Discharge risks or barriers?: No    Anticipated Discharge Information  Anticipated discharge disposition: Home  Discharge Address: [address] [place] [address]  Discharge Contact Phone Number: [phone]

## 2019-04-28 VITALS
TEMPERATURE: 98.9 F | SYSTOLIC BLOOD PRESSURE: 140 MMHG | DIASTOLIC BLOOD PRESSURE: 90 MMHG | WEIGHT: 187 LBS | BODY MASS INDEX: 24.78 KG/M2 | OXYGEN SATURATION: 92 % | HEART RATE: 86 BPM | HEIGHT: 73 IN | RESPIRATION RATE: 16 BRPM

## 2019-04-28 LAB
ANION GAP SERPL CALC-SCNC: 6 MMOL/L (ref 0–11.9)
BASOPHILS # BLD AUTO: 0.6 % (ref 0–1.8)
BASOPHILS # BLD: 0.03 K/UL (ref 0–0.12)
BUN SERPL-MCNC: 12 MG/DL (ref 8–22)
CALCIUM SERPL-MCNC: 8.3 MG/DL (ref 8.5–10.5)
CHLORIDE SERPL-SCNC: 108 MMOL/L (ref 96–112)
CO2 SERPL-SCNC: 23 MMOL/L (ref 20–33)
CREAT SERPL-MCNC: 0.99 MG/DL (ref 0.5–1.4)
EOSINOPHIL # BLD AUTO: 0.03 K/UL (ref 0–0.51)
EOSINOPHIL NFR BLD: 0.6 % (ref 0–6.9)
ERYTHROCYTE [DISTWIDTH] IN BLOOD BY AUTOMATED COUNT: 43.8 FL (ref 35.9–50)
GLUCOSE SERPL-MCNC: 115 MG/DL (ref 65–99)
HCT VFR BLD AUTO: 36.4 % (ref 42–52)
HGB BLD-MCNC: 12.4 G/DL (ref 14–18)
IMM GRANULOCYTES # BLD AUTO: 0.01 K/UL (ref 0–0.11)
IMM GRANULOCYTES NFR BLD AUTO: 0.2 % (ref 0–0.9)
LYMPHOCYTES # BLD AUTO: 0.85 K/UL (ref 1–4.8)
LYMPHOCYTES NFR BLD: 15.7 % (ref 22–41)
MCH RBC QN AUTO: 30.8 PG (ref 27–33)
MCHC RBC AUTO-ENTMCNC: 34.1 G/DL (ref 33.7–35.3)
MCV RBC AUTO: 90.3 FL (ref 81.4–97.8)
MONOCYTES # BLD AUTO: 0.66 K/UL (ref 0–0.85)
MONOCYTES NFR BLD AUTO: 12.2 % (ref 0–13.4)
NEUTROPHILS # BLD AUTO: 3.83 K/UL (ref 1.82–7.42)
NEUTROPHILS NFR BLD: 70.7 % (ref 44–72)
NRBC # BLD AUTO: 0 K/UL
NRBC BLD-RTO: 0 /100 WBC
PLATELET # BLD AUTO: 77 K/UL (ref 164–446)
PMV BLD AUTO: 10.9 FL (ref 9–12.9)
POTASSIUM SERPL-SCNC: 3.8 MMOL/L (ref 3.6–5.5)
RBC # BLD AUTO: 4.03 M/UL (ref 4.7–6.1)
SODIUM SERPL-SCNC: 137 MMOL/L (ref 135–145)
WBC # BLD AUTO: 5.4 K/UL (ref 4.8–10.8)

## 2019-04-28 PROCEDURE — 700102 HCHG RX REV CODE 250 W/ 637 OVERRIDE(OP): Performed by: HOSPITALIST

## 2019-04-28 PROCEDURE — 700105 HCHG RX REV CODE 258: Performed by: HOSPITALIST

## 2019-04-28 PROCEDURE — 80048 BASIC METABOLIC PNL TOTAL CA: CPT

## 2019-04-28 PROCEDURE — A9270 NON-COVERED ITEM OR SERVICE: HCPCS | Performed by: HOSPITALIST

## 2019-04-28 PROCEDURE — 85025 COMPLETE CBC W/AUTO DIFF WBC: CPT

## 2019-04-28 PROCEDURE — 99239 HOSP IP/OBS DSCHRG MGMT >30: CPT | Performed by: HOSPITALIST

## 2019-04-28 PROCEDURE — 36415 COLL VENOUS BLD VENIPUNCTURE: CPT

## 2019-04-28 RX ORDER — OXYCODONE HYDROCHLORIDE 5 MG/1
5 TABLET ORAL
Qty: 30 TAB | Refills: 0 | Status: SHIPPED | OUTPATIENT
Start: 2019-04-28 | End: 2019-05-04

## 2019-04-28 RX ADMIN — ACETAMINOPHEN 650 MG: 325 TABLET, FILM COATED ORAL at 11:54

## 2019-04-28 RX ADMIN — OXYCODONE HYDROCHLORIDE 5 MG: 5 TABLET ORAL at 03:28

## 2019-04-28 RX ADMIN — SENNOSIDES,DOCUSATE SODIUM 2 TABLET: 8.6; 5 TABLET, FILM COATED ORAL at 05:36

## 2019-04-28 RX ADMIN — ALLOPURINOL 300 MG: 300 TABLET ORAL at 05:36

## 2019-04-28 RX ADMIN — OXYCODONE HYDROCHLORIDE 5 MG: 5 TABLET ORAL at 11:54

## 2019-04-28 RX ADMIN — SODIUM CHLORIDE: 9 INJECTION, SOLUTION INTRAVENOUS at 03:29

## 2019-04-28 RX ADMIN — SIMVASTATIN 20 MG: 10 TABLET, FILM COATED ORAL at 05:36

## 2019-04-28 NOTE — DISCHARGE SUMMARY
Discharge Summary    CHIEF COMPLAINT ON ADMISSION  Chief Complaint   Patient presents with   • Flank Pain   • Blood in Urine       Reason for Admission  EMS     Admission Date  4/26/2019    CODE STATUS  Full Code    HPI & HOSPITAL COURSE  This is a 64 y.o. male with sarcoidosis, gout and hyperlipidemia here with left flank pain that radiated to his groin. Thought it was a kidney stone but when the pain didn't improve, he presented for further evaluation. Imaging at the outside facility shoed expansile heterogenous renal mass on the left, 11.2 x 10 x 10 cm with surrounding perinephric hemorrhage. On evaluation, WBC 5.6, hemoglobin 14, plt 93, BMP unremarkable aside from Cr 1.1. Urinalysis negative.     Urology was consulted, but since the mass was not actively bleeding, he was monitored conservatively. Hemoglobin was stable and CT chest showed no evidence of metastatic disease. Alk phos was normal. No plan for renal biopsy but patient will need further evaluation and it was recommended by urology that he go to an academic center. Urology gave him the information for an oncologic surgeon at [facility] and stated that he would call to send the referral on Monday (tomorrow). Patient is to also call to schedule an appointment and be evaluated for a nephrectomy. This was discussed with the patient and his wife, both were agreeable to the plan.       Therefore, he is discharged in good and stable condition to home with close outpatient follow-up.    The patient met 2-midnight criteria for an inpatient stay at the time of discharge.    Discharge Date  4/28/2019    FOLLOW UP ITEMS POST DISCHARGE  Please follow up with your PCP and Urology in [place].    DISCHARGE DIAGNOSES  Principal Problem:    Renal mass POA: Yes  Active Problems:    Sarcoidosis POA: Yes    Thrombocytopenia (HCC) POA: Yes    Gout, unspecified POA: Yes    HLD (hyperlipidemia) POA: Yes    CKD (chronic kidney disease) POA: Yes  Resolved Problems:    * No  resolved hospital problems. *      FOLLOW UP  Eleazar Murphy M.D.  [address]  [facility] [address]  [phone]    Schedule an appointment as soon as possible for a visit in 1 week  Hospital follow-up appointment with PCP    Paul Schneider M.D.  [address]  [place] [address]  [phone]    Call  As needed    Morgan Garcia M.D.  [address]  [facility] [address]  [facility] [address]  [phone]    Call in 1 day        MEDICATIONS ON DISCHARGE     Medication List      START taking these medications      Instructions   oxyCODONE immediate-release 5 MG Tabs  Commonly known as:  ROXICODONE   Take 1 Tab by mouth every 3 hours as needed for up to 30 doses.  Dose:  5 mg        CONTINUE taking these medications      Instructions   allopurinol 300 MG Tabs  Commonly known as:  ZYLOPRIM   Take 300 mg by mouth every morning.  Dose:  300 mg     simvastatin 20 MG Tabs  Commonly known as:  ZOCOR   Take 20 mg by mouth every morning.  Dose:  20 mg            Allergies  Allergies   Allergen Reactions   • Penicillins    • Penicillin G Unspecified     Unknown reaction as a child       DIET  Orders Placed This Encounter   Procedures   • Diet Order Regular     Standing Status:   Standing     Number of Occurrences:   1     Order Specific Question:   Diet:     Answer:   Regular [1]       ACTIVITY  As tolerated.  Weight bearing as tolerated    CONSULTATIONS  Urology    PROCEDURES  CT-CHEST (THORAX) W/O   Final Result      1.  There is no evidence of metastatic disease within the thorax.   2.  There is bibasilar atelectasis, left greater than right.   3.  There is partial visualization of the left perinephric hematoma.            OUTSIDE IMAGES-CT ABDOMEN /PELVIS   Final Result            LABORATORY  Lab Results   Component Value Date    SODIUM 137 04/28/2019    POTASSIUM 3.8 04/28/2019    CHLORIDE 108 04/28/2019    CO2 23 04/28/2019    GLUCOSE 115 (H) 04/28/2019    BUN 12 04/28/2019    CREATININE 0.99  04/28/2019        Lab Results   Component Value Date    WBC 5.4 04/28/2019    HEMOGLOBIN 12.4 (L) 04/28/2019    HEMATOCRIT 36.4 (L) 04/28/2019    PLATELETCT 77 (L) 04/28/2019        Total time of the discharge process exceeds 33 minutes.

## 2019-04-28 NOTE — CARE PLAN
Problem: Infection  Goal: Will remain free from infection    Intervention: Implement standard precautions and perform hand washing before and after patient contact  Implemented standard precautions, foaming in and out before care      Problem: Pain Management  Goal: Pain level will decrease to patient's comfort goal    Intervention: Follow pain managment plan developed in collaboration with patient and Interdisciplinary Team  Educated pt to verbalize when pain is not tolerable, assessing pain Q4 per protocol

## 2019-04-28 NOTE — PROGRESS NOTES
Bedside report received.  Assessment complete.    A&O x 4. Patient calls appropriately. Patient's wife at bedside.   Patient up self.   Patient reports mild flank pain left flank. States it is tolerable at this time.   Denies N&V. Tolerating regular diet.  + void, + flatus, last BM this AM.  Patient denies SOB.    Review plan with of care with patient. Call light and personal belongings with in reach. Hourly rounding in place. All needs met at this time.

## 2019-04-28 NOTE — PROGRESS NOTES
Report received from day RN, assumed Care.   Patient is AOx4, responds appropriately.      Pain controlled at this time.  Patient is tolerating regular diet, denies nausea/vomiting. + flatus (last BM 4/25), + void  Up self with steady gait.    Plan of care discussed, all questions answered.    Call light and belongings within reach, treaded slipper socks on, SCD in use, bed in lowest locked position.  Hourly rounding in place, all needs met at this time

## 2019-04-28 NOTE — DISCHARGE INSTRUCTIONS
Discharge Instructions      How can pain medicine affect me?  You were prescribed pain medicine. This medicine may:  · Make you tired or sleepy.  · Make you feel dizzy.  · Affect how well you can:  ¨ Drive  ¨ Do certain activities.  Pain medicine may not make all of your pain go away. You should be comfortable enough to:  · Move.  · Breathe.  · Take care of yourself.  How often should I take pain medicine and how much should I take?  · Take pain medicine only as told by your doctor and only as needed for pain.  · You do not need to take pain medicine if you are not having pain, unless your doctor tells you to do that.  · You can take less than the prescribed dose if you find that less medicine helps your pain.  · If you have very bad (severe) pain, call your doctor. Do not take more pills than told by your doctor. Do not take pills more often than told by your doctor.  What should I avoid while I am taking pain medicine?  Follow these instructions after you start taking pain medicine, while you are taking the medicine, and for 8 hours after you stop taking the medicine:  · Do not drive.  · Do not use machinery.  · Do not use power tools.  · Do not sign legal documents.  · Do not drink alcohol.  · Do not take sleeping pills.  · Do not take care of children by yourself.  · Do not do any activities that involve climbing or being in high places.  · Do not go into any body of water unless there is an adult nearby who can watch and help you. This includes:  ¨ Lakes.  ¨ Rivers.  ¨ Oceans.  ¨ Spas.  ¨ Swimming pools.  How can I keep others safe while I am taking pain medicine?  · Store your pain medicine as told by your doctor. Make sure that you keep it where children and pets cannot reach it.  · Do not share your pain medicine with anyone.  · Do not save any leftover pills. If you have any leftover pain medicine, get rid of it or destroy it as told by your doctor.  What else do I need to know about taking pain  medicine?  · Use a poop (stool) softener if you have trouble pooping (constipation) because of your pain medicine. Eating more fruits and vegetables also helps with constipation.  · Write down the times when you take your pain medicine. Look at the times before you take your next dose of medicine.  · Your pain medicine might have acetaminophen in it. Do not take any other acetaminophen while you are taking this medicine. An overdose of acetaminophen can do very bad damage to your liver. If you are taking any medicines in addition to your pain medicine, check the active ingredients on those medicines to see if acetaminophen is listed.  When should I call my doctor?  · Your medicine is not helping the pain.  · You do either of these soon after you take the medicine:  ¨ Throw up (vomit).  ¨ Have watery poop (diarrhea).  · You have new pain in areas that did not hurt before.  · You have an allergic reaction to your medicine. This may include:  ¨ Feeling itchy.  ¨ Swelling.  ¨ Feeling dizzy.  ¨ Getting a new rash.  · You cannot put up with feeling:  ¨ Dizzy.  ¨ Sick to your stomach (nauseous).  When should I call 911 or go to the emergency room?  · You pass out (faint).  · You feel very confused.  · You throw up again and again.  · Your skin or lips turn pale or bluish in color.  · You are:  ¨ Short of breath.  ¨ Breathing much more slowly than usual.  · You have a very bad allergic reaction to your medicine. This includes:  ¨ Developing a swollen tongue.  ¨ Having trouble breathing.  This information is not intended to replace advice given to you by your health care provider. Make sure you discuss any questions you have with your health care provider.  Document Released: 06/05/2009 Document Revised: 08/24/2017 Document Reviewed: 10/22/2015  © 2017 Elsevier      Discharged to home by car with relative. Discharged via wheelchair, hospital escort: Yes.  Special equipment needed: Not Applicable    Be sure to schedule a  follow-up appointment with your primary care doctor or any specialists as instructed.     Discharge Plan:   Diet Plan: Discussed  Activity Level: Discussed  Confirmed Follow up Appointment: Patient to Call and Schedule Appointment  Confirmed Symptoms Management: Discussed  Medication Reconciliation Updated: Yes  Influenza Vaccine Indication: Not indicated: Previously immunized this influenza season and > 8 years of age    I understand that a diet low in cholesterol, fat, and sodium is recommended for good health. Unless I have been given specific instructions below for another diet, I accept this instruction as my diet prescription.     · Is patient discharged on Warfarin / Coumadin?   No     Depression / Suicide Risk    As you are discharged from this [facility] facility, it is important to learn how to keep safe from harming yourself.    Recognize the warning signs:  · Abrupt changes in personality, positive or negative- including increase in energy   · Giving away possessions  · Change in eating patterns- significant weight changes-  positive or negative  · Change in sleeping patterns- unable to sleep or sleeping all the time   · Unwillingness or inability to communicate  · Depression  · Unusual sadness, discouragement and loneliness  · Talk of wanting to die  · Neglect of personal appearance   · Rebelliousness- reckless behavior  · Withdrawal from people/activities they love  · Confusion- inability to concentrate     If you or a loved one observes any of these behaviors or has concerns about self-harm, here's what you can do:  · Talk about it- your feelings and reasons for harming yourself  · Remove any means that you might use to hurt yourself (examples: pills, rope, extension cords, firearm)  · Get professional help from the community (Mental Health, Substance Abuse, psychological counseling)  · Do not be alone:Call your Safe Contact- someone whom you trust who will be there for you.  · Call your local  CRISIS HOTLINE 381-0363 or 256-202-8742  · Call your local Children's Mobile Crisis Response Team [place] (820) 642-0290 or www.MergeLocal  · Call the toll free National Suicide Prevention Hotlines   · National Suicide Prevention Lifeline 934-819-RNXM (6521)  · National [place] Resources Line Network 800-SUICIDE (701-2956)

## 2019-04-28 NOTE — PROGRESS NOTES
Patient discharged to home with family.   IV removed.   RN discussed discharge instructions with patient and his wife.

## 2019-04-28 NOTE — PROGRESS NOTES
"Urology consult note, follow up  Reason for ongoing consultation: renal mass    Overnight Events: None    S: No fevers, chills, nausea or vomiting.   Less pain    /90   Pulse 86   Temp 37.2 °C (98.9 °F) (Temporal)   Resp 16   Ht 1.854 m (6' 1\")   Wt 84.8 kg (187 lb)   SpO2 92%   Recent Labs      04/26/19 2020 04/27/19 0333 04/28/19 0316   SODIUM  137  138  137   POTASSIUM  3.8  4.0  3.8   CHLORIDE  106  107  108   CO2  25  25  23   GLUCOSE  120*  122*  115*   BUN  19  18  12   CREATININE  1.10  1.13  0.99   CALCIUM  8.8  8.5  8.3*     Recent Labs      04/26/19 2020 04/27/19 0333 04/28/19 0316   WBC  5.6  5.9  5.4   RBC  4.42*  4.29*  4.03*   HEMOGLOBIN  14.0  13.1*  12.4*   HEMATOCRIT  39.6*  38.4*  36.4*   MCV  89.6  89.5  90.3   MCH  31.7  30.5  30.8   MCHC  35.4*  34.1  34.1   RDW  43.9  44.0  43.8   PLATELETCT  93*  97*  77*   MPV  10.9  9.6  10.9         Exam:  Abdomen : left side full. Mild tender   Urine: clear      A/P:    Active Hospital Problems    Diagnosis   • Renal mass [N28.89]     Priority: High   • Sarcoidosis [D86.9]     Priority: Medium   • CKD (chronic kidney disease) [N18.9]   • HLD (hyperlipidemia) [E78.5]   • Thrombocytopenia (HCC) [D69.6]   • Gout, unspecified [M10.9]       Stable.   New recommendations: Discussed options. Agree that nephrectomy is in order for him. He and wife discussed with family including members who are physicians and their preference is to have nephrectomy performed at [facility]. They are given contact information for oncologic surgeon at [facility] to contact them as soon as can. We will help facillitate this. Do find him to be stable today for discharge with then next appt hopefully being at [facility] for consultation and nephrectomy     "

## 2019-05-03 LAB
BACTERIA BLD CULT: NORMAL
BACTERIA BLD CULT: NORMAL
SIGNIFICANT IND 70042: NORMAL
SIGNIFICANT IND 70042: NORMAL
SITE SITE: NORMAL
SITE SITE: NORMAL
SOURCE SOURCE: NORMAL
SOURCE SOURCE: NORMAL

## 2025-06-06 ENCOUNTER — APPOINTMENT (OUTPATIENT)
Dept: CARDIOLOGY | Facility: MEDICAL CENTER | Age: 70
DRG: 321 | End: 2025-06-06
Payer: MEDICARE

## 2025-06-06 ENCOUNTER — HOSPITAL ENCOUNTER (INPATIENT)
Facility: MEDICAL CENTER | Age: 70
LOS: 2 days | DRG: 321 | End: 2025-06-08
Attending: INTERNAL MEDICINE | Admitting: INTERNAL MEDICINE
Payer: MEDICARE

## 2025-06-06 ENCOUNTER — APPOINTMENT (OUTPATIENT)
Dept: RADIOLOGY | Facility: MEDICAL CENTER | Age: 70
DRG: 321 | End: 2025-06-06
Attending: STUDENT IN AN ORGANIZED HEALTH CARE EDUCATION/TRAINING PROGRAM
Payer: MEDICARE

## 2025-06-06 ENCOUNTER — APPOINTMENT (OUTPATIENT)
Dept: CARDIOLOGY | Facility: MEDICAL CENTER | Age: 70
DRG: 321 | End: 2025-06-06
Attending: INTERNAL MEDICINE
Payer: MEDICARE

## 2025-06-06 DIAGNOSIS — I25.83 CORONARY ARTERY DISEASE DUE TO LIPID RICH PLAQUE: Chronic | ICD-10-CM

## 2025-06-06 DIAGNOSIS — I21.21 STEMI INVOLVING LEFT CIRCUMFLEX CORONARY ARTERY (HCC): Primary | ICD-10-CM

## 2025-06-06 DIAGNOSIS — I82.409 RECURRENT DEEP VEIN THROMBOSIS (DVT) (HCC): ICD-10-CM

## 2025-06-06 DIAGNOSIS — Z85.528 HISTORY OF RENAL CELL CANCER: ICD-10-CM

## 2025-06-06 DIAGNOSIS — I25.10 CORONARY ARTERY DISEASE DUE TO LIPID RICH PLAQUE: Chronic | ICD-10-CM

## 2025-06-06 DIAGNOSIS — I21.21 ST ELEVATION MYOCARDIAL INFARCTION INVOLVING LEFT CIRCUMFLEX CORONARY ARTERY (HCC): Chronic | ICD-10-CM

## 2025-06-06 DIAGNOSIS — Z95.5 STATUS POST INSERTION OF DRUG ELUTING CORONARY ARTERY STENT: ICD-10-CM

## 2025-06-06 PROBLEM — I21.3 STEMI (ST ELEVATION MYOCARDIAL INFARCTION) (HCC): Status: ACTIVE | Noted: 2025-06-06

## 2025-06-06 PROBLEM — Z86.711 HX OF PULMONARY EMBOLUS: Status: ACTIVE | Noted: 2025-06-06

## 2025-06-06 LAB
ACT BLD: 239 SEC (ref 74–137)
ACT BLD: 302 SEC (ref 74–137)
ALBUMIN SERPL BCP-MCNC: 3.6 G/DL (ref 3.2–4.9)
ALBUMIN/GLOB SERPL: 1.6 G/DL
ALP SERPL-CCNC: 80 U/L (ref 30–99)
ALT SERPL-CCNC: 15 U/L (ref 2–50)
ANION GAP SERPL CALC-SCNC: 12 MMOL/L (ref 7–16)
AST SERPL-CCNC: 24 U/L (ref 12–45)
BASOPHILS # BLD AUTO: 1.4 % (ref 0–1.8)
BASOPHILS # BLD: 0.06 K/UL (ref 0–0.12)
BILIRUB SERPL-MCNC: 0.3 MG/DL (ref 0.1–1.5)
BUN SERPL-MCNC: 21 MG/DL (ref 8–22)
CALCIUM ALBUM COR SERPL-MCNC: 9.6 MG/DL (ref 8.5–10.5)
CALCIUM SERPL-MCNC: 9.3 MG/DL (ref 8.5–10.5)
CHLORIDE SERPL-SCNC: 107 MMOL/L (ref 96–112)
CHOLEST SERPL-MCNC: 121 MG/DL (ref 100–199)
CO2 SERPL-SCNC: 20 MMOL/L (ref 20–33)
CREAT SERPL-MCNC: 1.54 MG/DL (ref 0.5–1.4)
EKG IMPRESSION: NORMAL
EOSINOPHIL # BLD AUTO: 0.42 K/UL (ref 0–0.51)
EOSINOPHIL NFR BLD: 10 % (ref 0–6.9)
ERYTHROCYTE [DISTWIDTH] IN BLOOD BY AUTOMATED COUNT: 45.4 FL (ref 35.9–50)
GFR SERPLBLD CREATININE-BSD FMLA CKD-EPI: 48 ML/MIN/1.73 M 2
GLOBULIN SER CALC-MCNC: 2.3 G/DL (ref 1.9–3.5)
GLUCOSE SERPL-MCNC: 113 MG/DL (ref 65–99)
HCT VFR BLD AUTO: 43.9 % (ref 42–52)
HDLC SERPL-MCNC: 37 MG/DL
HGB BLD-MCNC: 14.9 G/DL (ref 14–18)
HOLDING TUBE BB 8507: NORMAL
HOLDING TUBE BB 8507: NORMAL
IMM GRANULOCYTES # BLD AUTO: 0.02 K/UL (ref 0–0.11)
IMM GRANULOCYTES NFR BLD AUTO: 0.5 % (ref 0–0.9)
LDLC SERPL CALC-MCNC: 74 MG/DL
LV EJECT FRACT  99904: 50
LV EJECT FRACT MOD 2C 99903: 52.29
LV EJECT FRACT MOD 4C 99902: 43.62
LV EJECT FRACT MOD BP 99901: 45.43
LYMPHOCYTES # BLD AUTO: 1.39 K/UL (ref 1–4.8)
LYMPHOCYTES NFR BLD: 33.2 % (ref 22–41)
MCH RBC QN AUTO: 31.1 PG (ref 27–33)
MCHC RBC AUTO-ENTMCNC: 33.9 G/DL (ref 32.3–36.5)
MCV RBC AUTO: 91.6 FL (ref 81.4–97.8)
MONOCYTES # BLD AUTO: 0.34 K/UL (ref 0–0.85)
MONOCYTES NFR BLD AUTO: 8.1 % (ref 0–13.4)
NEUTROPHILS # BLD AUTO: 1.96 K/UL (ref 1.82–7.42)
NEUTROPHILS NFR BLD: 46.8 % (ref 44–72)
NRBC # BLD AUTO: 0 K/UL
NRBC BLD-RTO: 0 /100 WBC (ref 0–0.2)
NT-PROBNP SERPL IA-MCNC: 100 PG/ML (ref 0–125)
PLATELET # BLD AUTO: 89 K/UL (ref 164–446)
PLATELETS.RETICULATED NFR BLD AUTO: 3.7 % (ref 0.6–13.1)
PMV BLD AUTO: 10.1 FL (ref 9–12.9)
POTASSIUM SERPL-SCNC: 4.1 MMOL/L (ref 3.6–5.5)
PROT SERPL-MCNC: 5.9 G/DL (ref 6–8.2)
RBC # BLD AUTO: 4.79 M/UL (ref 4.7–6.1)
SODIUM SERPL-SCNC: 139 MMOL/L (ref 135–145)
TRIGL SERPL-MCNC: 49 MG/DL (ref 0–149)
TROPONIN T SERPL-MCNC: 17 NG/L (ref 6–19)
TROPONIN T SERPL-MCNC: 2316 NG/L (ref 6–19)
TROPONIN T SERPL-MCNC: 4974 NG/L (ref 6–19)
TROPONIN T SERPL-MCNC: 5302 NG/L (ref 6–19)
WBC # BLD AUTO: 4.2 K/UL (ref 4.8–10.8)

## 2025-06-06 PROCEDURE — 700111 HCHG RX REV CODE 636 W/ 250 OVERRIDE (IP)

## 2025-06-06 PROCEDURE — 93306 TTE W/DOPPLER COMPLETE: CPT

## 2025-06-06 PROCEDURE — 85055 RETICULATED PLATELET ASSAY: CPT

## 2025-06-06 PROCEDURE — 92978 ENDOLUMINL IVUS OCT C 1ST: CPT | Mod: 26,LC | Performed by: INTERNAL MEDICINE

## 2025-06-06 PROCEDURE — 85347 COAGULATION TIME ACTIVATED: CPT

## 2025-06-06 PROCEDURE — A9270 NON-COVERED ITEM OR SERVICE: HCPCS

## 2025-06-06 PROCEDURE — 93010 ELECTROCARDIOGRAM REPORT: CPT | Performed by: INTERNAL MEDICINE

## 2025-06-06 PROCEDURE — 84484 ASSAY OF TROPONIN QUANT: CPT

## 2025-06-06 PROCEDURE — B2151ZZ FLUOROSCOPY OF LEFT HEART USING LOW OSMOLAR CONTRAST: ICD-10-PCS | Performed by: INTERNAL MEDICINE

## 2025-06-06 PROCEDURE — 99152 MOD SED SAME PHYS/QHP 5/>YRS: CPT | Performed by: INTERNAL MEDICINE

## 2025-06-06 PROCEDURE — 36415 COLL VENOUS BLD VENIPUNCTURE: CPT

## 2025-06-06 PROCEDURE — B2111ZZ FLUOROSCOPY OF MULTIPLE CORONARY ARTERIES USING LOW OSMOLAR CONTRAST: ICD-10-PCS | Performed by: INTERNAL MEDICINE

## 2025-06-06 PROCEDURE — 770022 HCHG ROOM/CARE - ICU (200)

## 2025-06-06 PROCEDURE — 92978 ENDOLUMINL IVUS OCT C 1ST: CPT

## 2025-06-06 PROCEDURE — 99223 1ST HOSP IP/OBS HIGH 75: CPT | Performed by: INTERNAL MEDICINE

## 2025-06-06 PROCEDURE — 99291 CRITICAL CARE FIRST HOUR: CPT | Mod: GC | Performed by: INTERNAL MEDICINE

## 2025-06-06 PROCEDURE — 71045 X-RAY EXAM CHEST 1 VIEW: CPT

## 2025-06-06 PROCEDURE — 83880 ASSAY OF NATRIURETIC PEPTIDE: CPT

## 2025-06-06 PROCEDURE — 93010 ELECTROCARDIOGRAM REPORT: CPT | Mod: 76 | Performed by: INTERNAL MEDICINE

## 2025-06-06 PROCEDURE — 83036 HEMOGLOBIN GLYCOSYLATED A1C: CPT

## 2025-06-06 PROCEDURE — 700111 HCHG RX REV CODE 636 W/ 250 OVERRIDE (IP): Mod: JZ | Performed by: STUDENT IN AN ORGANIZED HEALTH CARE EDUCATION/TRAINING PROGRAM

## 2025-06-06 PROCEDURE — 027036Z DILATION OF CORONARY ARTERY, ONE ARTERY WITH THREE DRUG-ELUTING INTRALUMINAL DEVICES, PERCUTANEOUS APPROACH: ICD-10-PCS | Performed by: INTERNAL MEDICINE

## 2025-06-06 PROCEDURE — 93005 ELECTROCARDIOGRAM TRACING: CPT | Mod: TC | Performed by: NURSE PRACTITIONER

## 2025-06-06 PROCEDURE — 4A023N7 MEASUREMENT OF CARDIAC SAMPLING AND PRESSURE, LEFT HEART, PERCUTANEOUS APPROACH: ICD-10-PCS | Performed by: INTERNAL MEDICINE

## 2025-06-06 PROCEDURE — 85025 COMPLETE CBC W/AUTO DIFF WBC: CPT

## 2025-06-06 PROCEDURE — 700102 HCHG RX REV CODE 250 W/ 637 OVERRIDE(OP)

## 2025-06-06 PROCEDURE — 700117 HCHG RX CONTRAST REV CODE 255: Performed by: INTERNAL MEDICINE

## 2025-06-06 PROCEDURE — 700101 HCHG RX REV CODE 250

## 2025-06-06 PROCEDURE — 93005 ELECTROCARDIOGRAM TRACING: CPT | Mod: TC | Performed by: STUDENT IN AN ORGANIZED HEALTH CARE EDUCATION/TRAINING PROGRAM

## 2025-06-06 PROCEDURE — 700105 HCHG RX REV CODE 258: Performed by: INTERNAL MEDICINE

## 2025-06-06 PROCEDURE — 93458 L HRT ARTERY/VENTRICLE ANGIO: CPT | Mod: 26,59 | Performed by: INTERNAL MEDICINE

## 2025-06-06 PROCEDURE — 99291 CRITICAL CARE FIRST HOUR: CPT

## 2025-06-06 PROCEDURE — 027035Z DILATION OF CORONARY ARTERY, ONE ARTERY WITH TWO DRUG-ELUTING INTRALUMINAL DEVICES, PERCUTANEOUS APPROACH: ICD-10-PCS | Performed by: INTERNAL MEDICINE

## 2025-06-06 PROCEDURE — 80061 LIPID PANEL: CPT

## 2025-06-06 PROCEDURE — 99292 CRITICAL CARE ADDL 30 MIN: CPT | Performed by: STUDENT IN AN ORGANIZED HEALTH CARE EDUCATION/TRAINING PROGRAM

## 2025-06-06 PROCEDURE — 93005 ELECTROCARDIOGRAM TRACING: CPT | Mod: TC | Performed by: INTERNAL MEDICINE

## 2025-06-06 PROCEDURE — 80053 COMPREHEN METABOLIC PANEL: CPT

## 2025-06-06 PROCEDURE — 93306 TTE W/DOPPLER COMPLETE: CPT | Mod: 26 | Performed by: INTERNAL MEDICINE

## 2025-06-06 PROCEDURE — 92941 PRQ TRLML REVSC TOT OCCL AMI: CPT | Mod: LC | Performed by: INTERNAL MEDICINE

## 2025-06-06 RX ORDER — CLOPIDOGREL BISULFATE 75 MG/1
75 TABLET ORAL DAILY
Status: DISCONTINUED | OUTPATIENT
Start: 2025-06-07 | End: 2025-06-08 | Stop reason: HOSPADM

## 2025-06-06 RX ORDER — HYDROMORPHONE HYDROCHLORIDE 1 MG/ML
0.2 INJECTION, SOLUTION INTRAMUSCULAR; INTRAVENOUS; SUBCUTANEOUS
Status: DISCONTINUED | OUTPATIENT
Start: 2025-06-06 | End: 2025-06-07

## 2025-06-06 RX ORDER — HEPARIN SODIUM 1000 [USP'U]/ML
2000 INJECTION, SOLUTION INTRAVENOUS; SUBCUTANEOUS PRN
Status: DISCONTINUED | OUTPATIENT
Start: 2025-06-06 | End: 2025-06-06

## 2025-06-06 RX ORDER — NITROGLYCERIN 0.4 MG/1
0.4 TABLET SUBLINGUAL ONCE
Status: COMPLETED | OUTPATIENT
Start: 2025-06-06 | End: 2025-06-06

## 2025-06-06 RX ORDER — MORPHINE SULFATE 4 MG/ML
2 INJECTION INTRAVENOUS EVERY 4 HOURS PRN
Status: DISCONTINUED | OUTPATIENT
Start: 2025-06-06 | End: 2025-06-06

## 2025-06-06 RX ORDER — ASPIRIN 81 MG/1
81 TABLET ORAL DAILY
Status: DISCONTINUED | OUTPATIENT
Start: 2025-06-06 | End: 2025-06-08 | Stop reason: HOSPADM

## 2025-06-06 RX ORDER — PHENYLEPHRINE HCL IN 0.9% NACL 1 MG/10 ML
SYRINGE (ML) INTRAVENOUS
Status: COMPLETED
Start: 2025-06-06 | End: 2025-06-06

## 2025-06-06 RX ORDER — CLOPIDOGREL 300 MG/1
TABLET, FILM COATED ORAL
Status: COMPLETED
Start: 2025-06-06 | End: 2025-06-06

## 2025-06-06 RX ORDER — EPTIFIBATIDE 2 MG/ML
INJECTION, SOLUTION INTRAVENOUS
Status: COMPLETED
Start: 2025-06-06 | End: 2025-06-06

## 2025-06-06 RX ORDER — PRASUGREL 10 MG/1
TABLET, FILM COATED ORAL
Status: COMPLETED
Start: 2025-06-06 | End: 2025-06-06

## 2025-06-06 RX ORDER — LOSARTAN POTASSIUM 25 MG/1
25 TABLET ORAL
Status: DISCONTINUED | OUTPATIENT
Start: 2025-06-06 | End: 2025-06-08

## 2025-06-06 RX ORDER — HEPARIN SODIUM 5000 [USP'U]/100ML
0-30 INJECTION, SOLUTION INTRAVENOUS CONTINUOUS
Status: DISCONTINUED | OUTPATIENT
Start: 2025-06-06 | End: 2025-06-06

## 2025-06-06 RX ORDER — CLOPIDOGREL BISULFATE 75 MG/1
75 TABLET ORAL DAILY
Status: DISCONTINUED | OUTPATIENT
Start: 2025-06-06 | End: 2025-06-06

## 2025-06-06 RX ORDER — SODIUM CHLORIDE 9 MG/ML
1.5 INJECTION, SOLUTION INTRAVENOUS CONTINUOUS
Status: ACTIVE | OUTPATIENT
Start: 2025-06-06 | End: 2025-06-06

## 2025-06-06 RX ORDER — HEPARIN SODIUM 200 [USP'U]/100ML
INJECTION, SOLUTION INTRAVENOUS
Status: COMPLETED
Start: 2025-06-06 | End: 2025-06-06

## 2025-06-06 RX ORDER — ATORVASTATIN CALCIUM 40 MG/1
40 TABLET, FILM COATED ORAL EVERY EVENING
Status: DISCONTINUED | OUTPATIENT
Start: 2025-06-06 | End: 2025-06-08 | Stop reason: HOSPADM

## 2025-06-06 RX ORDER — ONDANSETRON 2 MG/ML
4 INJECTION INTRAMUSCULAR; INTRAVENOUS EVERY 6 HOURS PRN
Status: DISCONTINUED | OUTPATIENT
Start: 2025-06-06 | End: 2025-06-08 | Stop reason: HOSPADM

## 2025-06-06 RX ORDER — VERAPAMIL HYDROCHLORIDE 2.5 MG/ML
INJECTION INTRAVENOUS
Status: COMPLETED
Start: 2025-06-06 | End: 2025-06-06

## 2025-06-06 RX ORDER — LOVASTATIN 40 MG/1
40 TABLET ORAL EVERY MORNING
Status: ON HOLD | COMMUNITY
End: 2025-06-06

## 2025-06-06 RX ORDER — CLOPIDOGREL 300 MG/1
600 TABLET, FILM COATED ORAL ONCE
Status: DISCONTINUED | OUTPATIENT
Start: 2025-06-06 | End: 2025-06-06

## 2025-06-06 RX ORDER — HEPARIN SODIUM 1000 [USP'U]/ML
INJECTION, SOLUTION INTRAVENOUS; SUBCUTANEOUS
Status: COMPLETED
Start: 2025-06-06 | End: 2025-06-06

## 2025-06-06 RX ORDER — ALLOPURINOL 300 MG/1
300 TABLET ORAL EVERY MORNING
Status: DISCONTINUED | OUTPATIENT
Start: 2025-06-06 | End: 2025-06-08 | Stop reason: HOSPADM

## 2025-06-06 RX ORDER — HYDROMORPHONE HYDROCHLORIDE 1 MG/ML
0.5 INJECTION, SOLUTION INTRAMUSCULAR; INTRAVENOUS; SUBCUTANEOUS
Status: DISCONTINUED | OUTPATIENT
Start: 2025-06-06 | End: 2025-06-07

## 2025-06-06 RX ORDER — SILDENAFIL 25 MG/1
25 TABLET, FILM COATED ORAL
COMMUNITY

## 2025-06-06 RX ORDER — HEPARIN SODIUM 5000 [USP'U]/ML
5000 INJECTION, SOLUTION INTRAVENOUS; SUBCUTANEOUS EVERY 8 HOURS
Status: DISCONTINUED | OUTPATIENT
Start: 2025-06-06 | End: 2025-06-06

## 2025-06-06 RX ORDER — HEPARIN SODIUM 1000 [USP'U]/ML
4000 INJECTION, SOLUTION INTRAVENOUS; SUBCUTANEOUS ONCE
Status: DISCONTINUED | OUTPATIENT
Start: 2025-06-06 | End: 2025-06-06

## 2025-06-06 RX ORDER — MIDAZOLAM HYDROCHLORIDE 1 MG/ML
INJECTION INTRAMUSCULAR; INTRAVENOUS
Status: COMPLETED
Start: 2025-06-06 | End: 2025-06-06

## 2025-06-06 RX ORDER — LIDOCAINE HYDROCHLORIDE 20 MG/ML
INJECTION, SOLUTION INFILTRATION; PERINEURAL
Status: COMPLETED
Start: 2025-06-06 | End: 2025-06-06

## 2025-06-06 RX ADMIN — HYDROMORPHONE HYDROCHLORIDE 0.5 MG: 1 INJECTION, SOLUTION INTRAMUSCULAR; INTRAVENOUS; SUBCUTANEOUS at 21:25

## 2025-06-06 RX ADMIN — CLOPIDOGREL BISULFATE 600 MG: 300 TABLET, FILM COATED ORAL at 02:54

## 2025-06-06 RX ADMIN — LIDOCAINE HYDROCHLORIDE: 20 INJECTION, SOLUTION INFILTRATION; PERINEURAL at 02:01

## 2025-06-06 RX ADMIN — NITROGLYCERIN 0.4 MG: 0.4 TABLET SUBLINGUAL at 12:40

## 2025-06-06 RX ADMIN — SODIUM CHLORIDE 1.5 ML/KG/HR: 9 INJECTION, SOLUTION INTRAVENOUS at 03:31

## 2025-06-06 RX ADMIN — HEPARIN SODIUM: 1000 INJECTION, SOLUTION INTRAVENOUS; SUBCUTANEOUS at 02:01

## 2025-06-06 RX ADMIN — IOHEXOL 75 ML: 350 INJECTION, SOLUTION INTRAVENOUS at 02:46

## 2025-06-06 RX ADMIN — EPTIFIBATIDE 15.4 MG: 2 INJECTION, SOLUTION INTRAVENOUS at 02:30

## 2025-06-06 RX ADMIN — FENTANYL CITRATE 75 MCG: 50 INJECTION, SOLUTION INTRAMUSCULAR; INTRAVENOUS at 02:47

## 2025-06-06 RX ADMIN — HUMAN ALBUMIN MICROSPHERES AND PERFLUTREN 3 ML: 10; .22 INJECTION, SOLUTION INTRAVENOUS at 12:15

## 2025-06-06 RX ADMIN — ALLOPURINOL 300 MG: 300 TABLET ORAL at 05:02

## 2025-06-06 RX ADMIN — MIDAZOLAM HYDROCHLORIDE 2 MG: 1 INJECTION, SOLUTION INTRAMUSCULAR; INTRAVENOUS at 02:31

## 2025-06-06 RX ADMIN — HEPARIN SODIUM 2000 UNITS: 200 INJECTION, SOLUTION INTRAVENOUS at 02:02

## 2025-06-06 RX ADMIN — EPTIFIBATIDE 15.4 MG: 2 INJECTION, SOLUTION INTRAVENOUS at 02:46

## 2025-06-06 RX ADMIN — ASPIRIN 81 MG: 81 TABLET, COATED ORAL at 05:02

## 2025-06-06 RX ADMIN — NITROGLYCERIN: 20 INJECTION INTRAVENOUS at 02:00

## 2025-06-06 RX ADMIN — ATORVASTATIN CALCIUM 40 MG: 40 TABLET, FILM COATED ORAL at 17:05

## 2025-06-06 RX ADMIN — ATROPINE SULFATE 1 MG: 0.1 INJECTION, SOLUTION ENDOTRACHEAL; INTRAMUSCULAR; INTRAVENOUS; SUBCUTANEOUS at 02:32

## 2025-06-06 RX ADMIN — HYDROMORPHONE HYDROCHLORIDE 0.5 MG: 1 INJECTION, SOLUTION INTRAMUSCULAR; INTRAVENOUS; SUBCUTANEOUS at 16:28

## 2025-06-06 RX ADMIN — VERAPAMIL HYDROCHLORIDE 2.5 MG: 2.5 INJECTION, SOLUTION INTRAVENOUS at 02:01

## 2025-06-06 SDOH — ECONOMIC STABILITY: TRANSPORTATION INSECURITY
IN THE PAST 12 MONTHS, HAS THE LACK OF TRANSPORTATION KEPT YOU FROM MEDICAL APPOINTMENTS OR FROM GETTING MEDICATIONS?: NO

## 2025-06-06 SDOH — ECONOMIC STABILITY: TRANSPORTATION INSECURITY
IN THE PAST 12 MONTHS, HAS LACK OF RELIABLE TRANSPORTATION KEPT YOU FROM MEDICAL APPOINTMENTS, MEETINGS, WORK OR FROM GETTING THINGS NEEDED FOR DAILY LIVING?: NO

## 2025-06-06 ASSESSMENT — COGNITIVE AND FUNCTIONAL STATUS - GENERAL
MOBILITY SCORE: 24
SUGGESTED CMS G CODE MODIFIER MOBILITY: CH
SUGGESTED CMS G CODE MODIFIER DAILY ACTIVITY: CH
DAILY ACTIVITIY SCORE: 24

## 2025-06-06 ASSESSMENT — LIFESTYLE VARIABLES
EVER HAD A DRINK FIRST THING IN THE MORNING TO STEADY YOUR NERVES TO GET RID OF A HANGOVER: NO
DOES PATIENT WANT TO STOP DRINKING: NO
CONSUMPTION TOTAL: INCOMPLETE
ON A TYPICAL DAY WHEN YOU DRINK ALCOHOL HOW MANY DRINKS DO YOU HAVE: 1
HOW MANY TIMES IN THE PAST YEAR HAVE YOU HAD 5 OR MORE DRINKS IN A DAY: 0
TOTAL SCORE: 0
EVER HAD A DRINK FIRST THING IN THE MORNING TO STEADY YOUR NERVES TO GET RID OF A HANGOVER: NO
HAVE YOU EVER FELT YOU SHOULD CUT DOWN ON YOUR DRINKING: NO
TOTAL SCORE: 0
ALCOHOL_USE: YES
TOTAL SCORE: 0
DOES PATIENT WANT TO STOP DRINKING: NO
EVER FELT BAD OR GUILTY ABOUT YOUR DRINKING: NO
CONSUMPTION TOTAL: NEGATIVE
TOTAL SCORE: 0
TOTAL SCORE: 0
HAVE PEOPLE ANNOYED YOU BY CRITICIZING YOUR DRINKING: NO
HAVE YOU EVER FELT YOU SHOULD CUT DOWN ON YOUR DRINKING: NO
EVER FELT BAD OR GUILTY ABOUT YOUR DRINKING: NO
AVERAGE NUMBER OF DAYS PER WEEK YOU HAVE A DRINK CONTAINING ALCOHOL: 7
ALCOHOL_USE: YES
HAVE PEOPLE ANNOYED YOU BY CRITICIZING YOUR DRINKING: NO
TOTAL SCORE: 0

## 2025-06-06 ASSESSMENT — SOCIAL DETERMINANTS OF HEALTH (SDOH)
WITHIN THE LAST YEAR, HAVE YOU BEEN HUMILIATED OR EMOTIONALLY ABUSED IN OTHER WAYS BY YOUR PARTNER OR EX-PARTNER?: NO
WITHIN THE LAST YEAR, HAVE TO BEEN RAPED OR FORCED TO HAVE ANY KIND OF SEXUAL ACTIVITY BY YOUR PARTNER OR EX-PARTNER?: NO
IN THE PAST 12 MONTHS, HAS THE ELECTRIC, GAS, OIL, OR WATER COMPANY THREATENED TO SHUT OFF SERVICE IN YOUR HOME?: NO
WITHIN THE PAST 12 MONTHS, YOU WORRIED THAT YOUR FOOD WOULD RUN OUT BEFORE YOU GOT THE MONEY TO BUY MORE: NEVER TRUE
IN THE PAST 12 MONTHS, HAS THE ELECTRIC, GAS, OIL, OR WATER COMPANY THREATENED TO SHUT OFF SERVICE IN YOUR HOME?: NO
WITHIN THE PAST 12 MONTHS, THE FOOD YOU BOUGHT JUST DIDN'T LAST AND YOU DIDN'T HAVE MONEY TO GET MORE: NEVER TRUE
WITHIN THE LAST YEAR, HAVE YOU BEEN AFRAID OF YOUR PARTNER OR EX-PARTNER?: NO
WITHIN THE PAST 12 MONTHS, YOU WORRIED THAT YOUR FOOD WOULD RUN OUT BEFORE YOU GOT THE MONEY TO BUY MORE: NEVER TRUE
WITHIN THE LAST YEAR, HAVE YOU BEEN KICKED, HIT, SLAPPED, OR OTHERWISE PHYSICALLY HURT BY YOUR PARTNER OR EX-PARTNER?: NO
WITHIN THE PAST 12 MONTHS, THE FOOD YOU BOUGHT JUST DIDN'T LAST AND YOU DIDN'T HAVE MONEY TO GET MORE: NEVER TRUE

## 2025-06-06 ASSESSMENT — PAIN DESCRIPTION - PAIN TYPE
TYPE: ACUTE PAIN;CHRONIC PAIN
TYPE: ACUTE PAIN
TYPE: ACUTE PAIN;CHRONIC PAIN
TYPE: ACUTE PAIN
TYPE: ACUTE PAIN;CHRONIC PAIN
TYPE: ACUTE PAIN
TYPE: ACUTE PAIN;CHRONIC PAIN
TYPE: ACUTE PAIN
TYPE: ACUTE PAIN;CHRONIC PAIN
TYPE: ACUTE PAIN

## 2025-06-06 ASSESSMENT — ENCOUNTER SYMPTOMS
WHEEZING: 0
APNEA: 0
CHOKING: 0
HEADACHES: 0
CHEST TIGHTNESS: 1
SHORTNESS OF BREATH: 0
WEAKNESS: 0
SPUTUM PRODUCTION: 0
COUGH: 0
CONSTITUTIONAL NEGATIVE: 1
DIZZINESS: 0
NAUSEA: 0
STRIDOR: 0
VOMITING: 0

## 2025-06-06 ASSESSMENT — FIBROSIS 4 INDEX: FIB4 SCORE: 5.01

## 2025-06-06 NOTE — PROGRESS NOTES
Lab called with critical result of Trop at 3797. Critical lab result read back.   Dr. Vogt notified of critical lab result at 3769.

## 2025-06-06 NOTE — ASSESSMENT & PLAN NOTE
Hx of PE/DVT 2017 - per chart review, was provoked in setting on ankle injury.   Hold Xarelto until after staged PCI

## 2025-06-06 NOTE — PROGRESS NOTES
Patient complaining of persistent chest pain, not worsening since arrival. YADY Bell was notified. Okay to give nitroglycerin for pain regardless of HR in 50s

## 2025-06-06 NOTE — PROGRESS NOTES
Med rec complete per pt and [facility] pharmacy  [phone]. No ABX taken in the last 30 days.     Xarelto was last taken on 6/5/25 AM  Pharmacist notified.

## 2025-06-06 NOTE — PROCEDURES
Cardiac Catheterization Procedure    DATE: 6/6/2025    : Ihsan Velasco MD, MPH    PROCEDURES PERFORMED:  Left heart catheterization  Coronary angiography  Emergent percutaneous coronary intervention of occluded left circumflex  IVUS of the left circumflex - STEMI culprit  Moderate conscious sedation supervision    INDICATIONS:  Inferoposterior STEMI    CONSENT:  Emergent procedure    MEDICATIONS:  Lidocaine  Fentanyl  Midazolam  Nitroglycerin  Verapamil  Heparin  Integrilin bolus x 2  Plavix 600 mg p.o.  Atropine    MODERATE CONSCIOUS SEDATION:  I personally supervised the administration of moderate conscious sedation by the nursing staff for 33 minutes.  Start time: 2:05 AM  End time: 2:38 AM    CONTRAST: Omnipaque 75 cc    RADIATION (Air Kerma): 434 mGy    FLUOROSCOPY TIME: 10.5 minutes    ESTIMATED BLOOD LOSS: < 50 cc    COMPLICATIONS: None apparent    PROCEDURE OVERVIEW:  The patient was brought to the cardiac catheterization laboratory in the fasting state. The skin over the right wrist was prepped and draped in the usual sterile fashion. Lidocaine infiltration was used to anesthetize the tissue over the right radial artery. Using the micropuncture technique, a 6-[place] Glidesheath was inserted in the right radial artery. A 5 [place] GTE Mangement Corper diagnostic catheter was then advanced over a standard J-wire into the left ventricular cavity where it was gently aspirated, flushed, and then withdrawn across the aortic valve with sequential pressures measured. This catheter was then used to engage the ostium of the left coronary artery and cineangiograms were obtained in multiple projections for complete evaluation of the left coronary system. This catheter was then used to engage the ostium of the right coronary artery and cineangiograms were obtained in multiple projections for complete evaluation of the right coronary system. Following completion of coronary angiography, we proceeded with PCI of the left  circumflex. See below for more details.     HEMODYNAMICS:  Aortic pressure: 103/75 mmHg  LVEDP: 25 mmHg  No significant aortic gradient on pullback    LEFT VENTRICULOGRAPHY   Estimated LVEF= 40%.  Inferior wall hypokinesis    CORONARY ANGIOGRAPHY:  The left main coronary artery: Large-caliber normal-appearing vessel that bifurcates to LAD and left circumflex  The left anterior descending coronary artery: Large-caliber transapical vessel, calcified, with a long segment of 80% stenosis throughout the mid LAD right after the takeoff of a large diagonal branch.  Mild CAD throughout the diagonal branch  The left circumflex coronary artery: Large-caliber codominant vessel with 100% mid occlusion status post successful PCI as detailed below.  The right coronary artery: Large-caliber codominant vessel with mild CAD    PERCUTANEOUS CORONARY INTERVENTION of occluded left circumflex:  Pre: 100% stenosis and CIPRIANO 0 flow  Post: 0% residual stenosis and CIPRIANO II flow.   Guide Catheter(s): 6 [place] Islander EBU 3.5  Guidewire(s): Run-through  Anticoagulation:  Heparin to maintain ACT > 250  Antiplatelet(s): Aspirin, Plavix, Integrilin  Pre-dilation balloon(s): 3 x 12 mm  IVUS or OCT used: IVUS guided  Intracoronary Atherectomy / Lithotripsy: None  Stent: Overlapping Synergy 4 x 12 mm, 3 x 28 mm and 2.5 x 12 mm Evelin  Post-dilation balloon(s): Stent balloon 2.5 mm, 4 x 15 mm NC     No dissection, distal embolization or perforation post PCI.  Mild no reflow towards the end    Closing: At completion of the procedure the relevant equipment was removed from the body and hemostasis achieved by Radial band for the right radial arteriotomy site.    The patient left the cath lab  CP free,  hemodynamically stable and neurologically intact.      IMPRESSION:  1.  Occluded codominant left circumflex status post successful IVUS guided PCI deploying overlapping Synergy 4 x 12 mm, 3 x 28 mm and 2.5 x 12 mm Evelin.  Heavy plaque burden noted on imaging  2.   Residual severe mid LAD disease, CIPRIANO-3 flow  3.  Reduced estimated LVEF 40% with elevated resting LVEDP 25 mmHg no significant transaortic gradient on pullback    RECOMMENDATIONS:  Aspirin 81 mg plus Plavix 75 mg plus Xarelto for 2 weeks followed by Plavix and Xarelto for at least 1 year  Staged LAD PCI either before discharge or as outpatient in 4 weeks based on clinical progress mainly creatinine clearance given solitary kidney with underlying CKD.  HI statin / PCSK9-I: Target LDL < 55 and TG < 150  GDMT and lifestyle modifications for secondary ASCVD prevention  Cardiac Rehab referral  ICU level of care for at least next 12-24 hours  TR band protocol  IV fluids post PCI    NOTIFICATION:  The patient's family were updated in the waiting area    Admitting intensivist- Dr. Perez - was notified.    Ihsan Velasco MD, MPH [facility]  Interventional Cardiologist  [facility]   of Clinical Internal Medicine - [facility] [place]

## 2025-06-06 NOTE — PROGRESS NOTES
4 Eyes Skin Assessment Completed by NEIL Hart and Frantz RN.    Skin assessment is primarily focused on high risk bony prominences. Pay special attention to skin beneath and around medical devices, high risk bony prominences, skin to skin areas and areas where the patient lacks sensation to feel pain and areas where the patient previously had breakdown.     Head (Occipital):  WDL   Ears (Under Medical Devices): WDL   Nose (Under Medical Devices): WDL   Mouth:  WDL   Neck: WDL   Breast/Chest:  WDL   Shoulder Blades:  Red and Blanching   Spine:   Red and Blanching   (R) Arm/Elbow/Hand: Red, Blanching, and Bruising   (L) Arm/Elbow/Hand: Red, Blanching, and Bruising   Abdomen: WDL   Pannus/Groin:  WDL   Sacrum/Coccyx:   WDL   (R) Ischial Tuberosity (Sit Bones):  WDL   (L) Ischial Tuberosity (Sit Bones):  WDL   (R) Leg:  WDL   (L) Leg:  Scab and Bruising   (R) Heel:  WDL   (R) Foot/Toe: WDL   (L) Heel: WDL   (L) Foot/Toe:  WDL       DEVICES IN USE:   Respiratory Devices:  NA, patient on room air  Feeding Devices:  N/A   Lines & BP Monitoring Devices:  Peripheral IV, BP cuff, and Pulse ox    Orthopedic Devices:  N/A  Miscellaneous Devices:  Telemetry monitor    PROTOCOL INTERVENTIONS:   ICU Low Airlo Bed:  Already in place  Offloading Dressing - Heel:  Already in place  Float Heels with Pillows:  Already in place  Glide Sheet:  Already in place    WOUND PHOTOS:   N/A no wounds identified    WOUND CONSULT:   N/A, no advanced wound care needs identified

## 2025-06-06 NOTE — H&P
[facility]    Date of Service  6/6/2025    [facility] Team: R ICU Gold Team   Attending: Sam Perez D.o.  Senior Resident: Dr. Martin Vogt  Contact Number: [phone]    Primary Care Physician  Eleazar Murphy M.D.    Consultants  cardiology    Specialist Names: Dr. Marin    Code Status  Full Code    Chief Complaint  No chief complaint on file.      History of Presenting Illness (HPI): Hemant Martínez is a 70 y.o. male with a medical history of renal cell carcinoma status post nephrectomy, solitary kidney, hyperlipidemia, gout and DVTs/pulmonary embolisms on chronic anticoagulation who presented 6/6/2025 with chest pain.  The patient provides his own history and states that he woke up with chest pressure that \"did not feel right,\" which radiated down both arms and would not go away, though it did not worsen with activity.  Therefore, he presented to the ED.  He was found to have WBCs 4.2, glucose of 113, creatinine of 1.54 and a GFR of 48, troponin of 17, and BNP of 100.  EKG demonstrated ST elevations and therefore he was immediately activated as a code STEMI and taken to the Cath Lab where he received PCI with three stents as documented in interventional cardiology's note but included the codominant left circumflex artery. The patient experienced bradycardia, requiring atropine, with improvement but his end-diastolic pressure was reportedly 25 and therefore, ICU admission was requested.     At bedside, the patient is feeling well without chest pain.  He denies ever feeling short of breath or having palpitations.  His temperature is 35.7 degrees celsius but otherwise, his  vitals are hemodynamically stable on room air and all his questions and concerns were answered to satisfaction.    I discussed the plan of care with patient.    Review of Systems  Review of Systems   Respiratory:  Negative for cough and shortness of breath.    Cardiovascular:  Negative for chest pain.   Gastrointestinal: "  Negative for nausea and vomiting.   Neurological:  Negative for dizziness, weakness and headaches.   All other systems reviewed and are negative.      Past Medical History   has no past medical history on file.    Surgical History   has no past surgical history on file.     Family History  family history is not on file.   Family history reviewed with patient.     Social History  Tobacco: Reported as never  Alcohol: Socially, splits a beer with his wife, occasional gin and tonic  Recreational drugs (illegal or prescription): Reported has never  Employment: Retired   Living Situation: Lives with his wife in the [place] region      Allergies  Allergies[1]    Medications  Prior to Admission Medications   Prescriptions Last Dose Informant Patient Reported? Taking?   allopurinol (ZYLOPRIM) 300 MG Tab  Patient Yes No   Sig: Take 300 mg by mouth every morning.   simvastatin (ZOCOR) 20 MG Tab  Patient Yes No   Sig: Take 20 mg by mouth every morning.      Facility-Administered Medications: None       Physical Exam  Temp:  [35.7 °C (96.2 °F)] 35.7 °C (96.2 °F)  Pulse:  [78] 78  Resp:  [12] 12  BP: (109)/(61) 109/61  SpO2:  [93 %] 93 %  Blood Pressure : 109/61   Temperature: (!) 35.7 °C (96.2 °F)   Pulse: 78   Respiration: 12   Pulse Oximetry: 93 %       Physical Exam  Constitutional:       General: He is not in acute distress.     Appearance: Normal appearance. He is not ill-appearing.   HENT:      Head: Normocephalic and atraumatic.      Right Ear: External ear normal.      Left Ear: External ear normal.      Nose: Nose normal.      Mouth/Throat:      Mouth: Mucous membranes are moist.      Pharynx: Oropharynx is clear.   Eyes:      Extraocular Movements: Extraocular movements intact.      Conjunctiva/sclera: Conjunctivae normal.      Pupils: Pupils are equal, round, and reactive to light.   Cardiovascular:      Rate and Rhythm: Normal rate and regular rhythm.      Pulses: Normal pulses.      Heart sounds:  Normal heart sounds. No murmur heard.     No friction rub. No gallop.   Pulmonary:      Effort: Pulmonary effort is normal. No respiratory distress.      Breath sounds: Normal breath sounds. No wheezing, rhonchi or rales.   Abdominal:      General: Abdomen is flat. Bowel sounds are normal. There is no distension.      Palpations: Abdomen is soft.      Tenderness: There is no abdominal tenderness. There is no guarding or rebound.   Musculoskeletal:         General: No swelling or tenderness. Normal range of motion.      Cervical back: Normal range of motion and neck supple. No tenderness.      Right lower leg: No edema.      Left lower leg: No edema.   Lymphadenopathy:      Cervical: No cervical adenopathy.   Skin:     General: Skin is warm and dry.      Capillary Refill: Capillary refill takes less than 2 seconds.   Neurological:      General: No focal deficit present.      Mental Status: He is alert and oriented to person, place, and time. Mental status is at baseline.   Psychiatric:         Mood and Affect: Mood normal.         Behavior: Behavior normal.         Laboratory:  Recent Labs     06/06/25  0141   WBC 4.2*   RBC 4.79   HEMOGLOBIN 14.9   HEMATOCRIT 43.9   MCV 91.6   MCH 31.1   MCHC 33.9   RDW 45.4   PLATELETCT 89*   MPV 10.1     Recent Labs     06/06/25  0141   SODIUM 139   POTASSIUM 4.1   CHLORIDE 107   CO2 20   GLUCOSE 113*   BUN 21   CREATININE 1.54*   CALCIUM 9.3     Recent Labs     06/06/25  0141   ALTSGPT 15   ASTSGOT 24   ALKPHOSPHAT 80   TBILIRUBIN 0.3   GLUCOSE 113*         Recent Labs     06/06/25  0141   NTPROBNP 100         Recent Labs     06/06/25  0141   TROPONINT 17       Imaging:  DX-CHEST-LIMITED (1 VIEW)   Final Result      No evidence of acute cardiopulmonary process.      CL-LEFT HEART CATHETERIZATION WITH POSSIBLE INTERVENTION    (Results Pending)   EC-ECHOCARDIOGRAM COMPLETE W/O CONT    (Results Pending)       X-Ray:  I have personally reviewed the images and compared with prior  images.  EKG:  I have personally reviewed the images and compared with prior images.    Assessment/Plan:    * STEMI (ST elevation myocardial infarction) (HCC)- (present on admission)  Assessment & Plan  Patient activated as a code STEMI or EKG findings on arrival  Status post left heart cath with stenting x 3  Started on DAPT, cardiology consulted, appreciate recs  Xarelto  High-dose atorvastatin, lipid panel pending  Start GDMT when appropriate    CAD (coronary artery disease)  Assessment & Plan  See STEMI    Hx of pulmonary embolus  Assessment & Plan  And DVTs on chronic anticoagulation    History of renal cell cancer  Assessment & Plan  Status post nephrectomy, solitary kidney  With CKD, possible SAMUEL    CKD (chronic kidney disease)- (present on admission)  Assessment & Plan  Possible SAMUEL on CKD, baseline creatinine from 2019 well below current threshold  Trend BMP  Renally dose medications  Avoid nephrotoxins  Hold off on starting ACE/ARB at this point    HLD (hyperlipidemia)- (present on admission)  Assessment & Plan  Change home lovastatin to high intensity statin per cardiology recs  HI statin / PCSK9-I: Target LDL < 55 and TG < 150   Lipid panel ordered    Gout, unspecified- (present on admission)  Assessment & Plan  Continue home allopurinol        VTE prophylaxis: therapeutic anticoagulation with home xarelto    I have discussed the details of this case including assessment and plan with my attending, Dr. Perez, who is in agreement. ICU admission 12-24 hours post PCI.     Martin Vogt M.D.         [1]   Allergies  Allergen Reactions    Penicillins     Penicillin G Unspecified     Unknown reaction as a child

## 2025-06-06 NOTE — CARE PLAN
The patient is Watcher - Medium risk of patient condition declining or worsening    Shift Goals  Clinical Goals: Rest, hemodynamic stability  Patient Goals: Rest  Family Goals: SRIDHAR    Progress made toward(s) clinical / shift goals:    Problem: Knowledge Deficit - Standard  Goal: Patient and family/care givers will demonstrate understanding of plan of care, disease process/condition, diagnostic tests and medications  Outcome: Progressing     Problem: Hemodynamics  Goal: Patient's hemodynamics, fluid balance and neurologic status will be stable or improve  Outcome: Progressing     Problem: Pain - Standard  Goal: Alleviation of pain or a reduction in pain to the patient’s comfort goal  Outcome: Progressing     Problem: Fall Risk  Goal: Patient will remain free from falls  Outcome: Progressing

## 2025-06-06 NOTE — PROGRESS NOTES
4 Eyes Skin Assessment Completed by NEIL Todd and NEIL Calderon.    Skin assessment is primarily focused on high risk bony prominences. Pay special attention to skin beneath and around medical devices, high risk bony prominences, skin to skin areas and areas where the patient lacks sensation to feel pain and areas where the patient previously had breakdown.     Head (Occipital):  WDL   Ears (Under Medical Devices): Red and Blanching   Nose (Under Medical Devices): WDL   Mouth:  WDL   Neck: Red and Blanching   Breast/Chest:  WDL   Shoulder Blades:  Red and Blanching   Spine:   Red and Blanching   (R) Arm/Elbow/Hand: Red, Blanching, and Bruising   (L) Arm/Elbow/Hand: Red, Blanching, and Bruising   Abdomen: WDL   Pannus/Groin:  WDL   Sacrum/Coccyx:   WDL   (R) Ischial Tuberosity (Sit Bones):  WDL   (L) Ischial Tuberosity (Sit Bones):  WDL   (R) Leg:  Red and Blanching   (L) Leg:  Scab, Red, Blanching, and Bruising   (R) Heel:  Red and Blanching   (R) Foot/Toe: Red and Blanching   (L) Heel: Red and Blanching   (L) Foot/Toe:  Red and Blanching       DEVICES IN USE:   Respiratory Devices:  NA, patient on room air and Pulse ox  Feeding Devices:  N/A   Lines & BP Monitoring Devices:  Peripheral IV, BP cuff, and Pulse ox    Orthopedic Devices:  N/A  Miscellaneous Devices:  TR Band    PROTOCOL INTERVENTIONS:   ICU Low Airlo Bed:  Already in place  Offloading Dressing - Heel:  Reinforced/reapplied    WOUND PHOTOS:   N/A no wounds identified    WOUND CONSULT:   N/A, no advanced wound care needs identified

## 2025-06-06 NOTE — ASSESSMENT & PLAN NOTE
history of sarcoidosis first diagnosed in the 1980s in setting of incidental mediastinal adenopathy. He had no treatment until the 1990s when he was noted to have abnormal kidney function for which he was treated with steroids as it was thought he had kidney involvement of his sarcoid.

## 2025-06-06 NOTE — DISCHARGE PLANNING
STEMI Response    Referral: Code STEMI Response    Intervention: MSW responded to STEMI.  Pt was BIB [facility] after STEMI.  Pt was alert upon arrival.  Pts name is Hemant Martínez (: 1955).  HANNAH obtained the following pt information: The patient reports his wife is on her way to [facility], her name is Westley. SW let the patient call his wife while he was waiting for Cath Lab. The patient updated his wife on his plan of care.     Westley Martínez (wife) [phone]    Plan: SW will remain available for pt support.

## 2025-06-06 NOTE — PROGRESS NOTES
Cardiology Follow Up Progress Note    Date of Service  6/6/2025    Attending Physician  Bernadine Hernandez*    Chief Complaint     Inferoposterior STEMI    HPI  Hemant Martínez is a 70 y.o. male with no prior cardiac history, hyperlipidemia, left nephrectomy 2019  secondary to RCC, hx of   on Xarelto admitted 6/6/2025 with chest pressure found inferior STEMI on EKG.      Interim Events    No overnight cardiac events  Telemetry-SB  Underwent successful PCI of LCx 6/6/2025  Patient reports persistent/residual chest pain post PCI  EKG post PCI unremarkable.  Tentatively plan for staged PCI in the morning.  N.p.o. at midnight.  PRN Morphine    Review of Systems  Review of Systems   Respiratory:  Positive for chest tightness. Negative for apnea, cough, choking, shortness of breath, wheezing and stridor.    Cardiovascular:  Positive for chest pain. Negative for leg swelling.       Vital signs in last 24 hours  Temp:  [35.7 °C (96.2 °F)-35.9 °C (96.7 °F)] 35.9 °C (96.7 °F)  Pulse:  [53-78] 54  Resp:  [8-24] 8  BP: ()/(57-85) 101/63  SpO2:  [90 %-100 %] 93 %    Physical Exam  Physical Exam  Cardiovascular:      Rate and Rhythm: Bradycardia present.      Pulses: Normal pulses.   Pulmonary:      Effort: Pulmonary effort is normal.   Skin:     General: Skin is warm.   Neurological:      Mental Status: He is alert. Mental status is at baseline.   Psychiatric:         Mood and Affect: Mood normal.         Lab Review  Lab Results   Component Value Date/Time    WBC 4.2 (L) 06/06/2025 01:41 AM    RBC 4.79 06/06/2025 01:41 AM    HEMOGLOBIN 14.9 06/06/2025 01:41 AM    HEMATOCRIT 43.9 06/06/2025 01:41 AM    MCV 91.6 06/06/2025 01:41 AM    MCH 31.1 06/06/2025 01:41 AM    MCHC 33.9 06/06/2025 01:41 AM    MPV 10.1 06/06/2025 01:41 AM      Lab Results   Component Value Date/Time    SODIUM 139 06/06/2025 01:41 AM    POTASSIUM 4.1 06/06/2025 01:41 AM    CHLORIDE 107 06/06/2025 01:41 AM    CO2 20 06/06/2025 01:41 AM    GLUCOSE  113 (H) 06/06/2025 01:41 AM    BUN 21 06/06/2025 01:41 AM    CREATININE 1.54 (H) 06/06/2025 01:41 AM      Lab Results   Component Value Date/Time    ASTSGOT 24 06/06/2025 01:41 AM    ALTSGPT 15 06/06/2025 01:41 AM     Lab Results   Component Value Date/Time    CHOLSTRLTOT 121 06/06/2025 03:40 AM    LDL 74 06/06/2025 03:40 AM    HDL 37 (A) 06/06/2025 03:40 AM    TRIGLYCERIDE 49 06/06/2025 03:40 AM    TROPONINT 5302 (H) 06/06/2025 08:55 AM       Recent Labs     06/06/25  0141   NTPROBNP 100       Cardiac Imaging and Procedures Review  EKG:  inferoposterior infarct    Echocardiogram:  6/6/25  No prior study is available for comparison.   The left ventricular ejection fraction is visually estimated to be 45-  50%.  Global hypokinesis with regional variation.  Moderate mitral regurgitation.  Moderate tricuspid regurgitation.  Estimated right ventricular systolic pressure is 67 mmHg.    Cardiac Catheterization:    IMPRESSION:  1.  Occluded codominant left circumflex status post successful IVUS guided PCI deploying overlapping Synergy 4 x 12 mm, 3 x 28 mm and 2.5 x 12 mm Evelin.  Heavy plaque burden noted on imaging  2.  Residual severe mid LAD disease, CIPRIANO-3 flow  3.  Reduced estimated LVEF 40% with elevated resting LVEDP 25 mmHg no significant transaortic gradient on pullbac    Imaging  Chest X-Ray:     No evidence of acute cardiopulmonary process.     Assessment/Plan    1-Inferoposterior STEMI, LCx culprit.  2-solitary kidney  3-SAMUEL  4-Hyperlipidemia    -Underwent successful PCI/TERRI x2 overlapping LCx.  - Patient has severe residual mid LAD disease.  -Tentatively plan for staged PCI mid LAD 6/7/2025 pending renal function in am.  - BMP in the morning.  - Continue aspirin 81, Plavix 75.  - Xarelto on hold tentatively plan for staged PCI in the morning.  - Atorvastatin 40 daily.  - LVEF 40% by LV gram, official echo pending.  - LVEDP 25  - Residual/persistent chest pain post recent PCI, PRN Morphine. EKG  unremarkable.    Cardiology will follow along    Thank you for allowing me to participate in the care of this patient.  I will continue to follow this patient    Please contact me with any questions.    JONH Mendoza.   Cardiologist, [facility]  [phone]

## 2025-06-06 NOTE — ASSESSMENT & PLAN NOTE
Cr appears at baseline, Cr: 1.5  Trend BMP  Renally dose medications  Avoid nephrotoxins  Hold off on starting ACE/ARB in light of probable repeat LHC

## 2025-06-06 NOTE — ASSESSMENT & PLAN NOTE
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 Sanchez at [facility]  Has underlying CKD  Trend Cr

## 2025-06-06 NOTE — DISCHARGE INSTR - DIET
Heart-Healthy Nutrition Therapy    A heart-healthy diet is recommended to reduce your unhealthy blood cholesterol levels, manage high blood pressure, and lower your risk for heart disease.    To follow a heart-healthy diet,    Eat a balanced diet with whole grains, fruits and vegetables, and lean protein sources.  Achieve and maintain a healthy weight.  Choose heart-healthy unsaturated fats. Limit saturated fats, trans fats, and cholesterol intake. Eat more plant-based or vegetarian meals using beans and soy foods for protein.  Eat whole, unprocessed foods to limit the amount of sodium (salt) you eat.  Limit refined carbohydrates especially sugar, sweets and sugar-sweetened beverages.  If you drink alcohol, do so in moderation: one serving per day (women) and two servings per day (men).  One serving is equivalent to 12 ounces beer, 5 ounces wine, or 1.5 ounces distilled spirits    Tips for Choosing Heart-Healthy Fats    Choose lean protein and low-fat dairy foods to reduce saturated fat intake.    Saturated fat is usually found in animal-based protein and is associated with certain health risks. Saturated fat is the biggest contributor to raised low-density lipoprotein (LDL) cholesterol levels in the diet. Research shows that limiting saturated fat lowers unhealthy cholesterol levels. Eat no more than 5-6% of your total calories each day from saturated fat. Ask your registered dietitian nutritionist (RDN) to help you determine how much saturated fat is right for you.  There are many foods that do not contain large amounts of saturated fats. Swapping these foods to replace foods high in saturated fats will help you limit the saturated fat you eat and improve your cholesterol levels. You can also try eating more plant-based or vegetarian meals.        Avoid trans fats    Trans fats increase levels of LDL-cholesterol. Hydrogenated fat in processed foods is the main source of trans fats in foods.   Trans fats can be  found in stick margarine, shortening, processed sweets, baked goods, some fried foods, and packaged foods made with hydrogenated oils. Avoid foods with “partially hydrogenated oil” on the ingredient list such as: cookies, pastries, baked goods, biscuits, crackers, microwave popcorn, and frozen dinners.    Choose foods with heart healthy fats    Polyunsaturated and monounsaturated fat are unsaturated fats that may help lower your blood cholesterol level when used in place of saturated fat in your diet.  Ask your RDN about taking a dietary supplement with plant sterols and stanols to help lower your cholesterol level.  Research shows that substituting saturated fats with unsaturated fats is beneficial to cholesterol levels. Try these easy swaps:      Limit the amount of cholesterol you eat to less than 200 milligrams per day.    Cholesterol is a substance carried through the bloodstream via lipoproteins, which are known as “transporters” of fat. Some body functions need cholesterol to work properly, but too much cholesterol in the bloodstream can damage arteries and build up blood vessel linings (which can lead to heart attack and stroke). You should eat less than 200 milligrams cholesterol per day.  People respond differently to eating cholesterol. There is no test available right now that can figure out which people will respond more to dietary cholesterol and which will respond less. For individuals with high intake of dietary cholesterol, different types of increase (none, small, moderate, large) in LDL-cholesterol levels are all possible.    Food sources of cholesterol include egg yolks and organ meats such as liver, gizzards.  Limit egg yolks to two to four per week and avoid organ meats like liver and gizzards to control cholesterol intake.    Tips for Choosing Heart-Healthy Carbohydrates    Consume foods rich in viscous (soluble) fiber    Viscous, or soluble, fiber is found in the walls of plant cells. Viscous  fiber is found only in plant-based foods--animal-based foods like meat or dairy products do not contain fiber. In the stomach, viscous fibers absorb water and swell to form a thick, jelly-like mass. This helps to lower your unhealthy cholesterol  Rich sources of viscous fiber include asparagus, [place] sprouts, sweet potatoes, turnips, apricots, mangoes, oranges, legumes, barley, oats, and oat bran.  Eat at least 5 to 10 grams of viscous fiber each day. As you increase your fiber intake gradually, also increase the amount of water you drink. This will help prevent constipation.  If you have difficulty achieving this goal, ask your RDN about fiber laxatives. Choose fiber supplements made with viscous fibers such as psyllium seed husks or methylcellulose to help lower unhealthy cholesterol.    Limit refined carbohydrates    There are three types of carbohydrates: starches, sugar, and fiber. Some carbohydrates occur naturally in food, like the starches in rice or corn or the sugars in fruits and milk. Refined carbohydrates--foods with high amounts of simple sugars--can raise triglyceride levels. High triglyceride levels are associated with coronary heart disease.  Some examples of refined carbohydrate foods are table sugar, sweets, and beverages sweetened with added sugar.    Tips for Reducing Sodium (Salt)  Although sodium is important for your body to function, too much sodium can be harmful for people with high blood pressure.  As sodium and fluid buildup in your tissues and bloodstream, your blood pressure increases. High blood pressure may cause damage to other organs and increase your risk for a stroke.    Keep your salt intake to 2300 milligrams or less per day. Even if you take a pill for blood pressure or a water pill (diuretic) to remove fluid, it is still important to have less salt in your diet. Ask your RDN what amount of sodium is right for you.    Avoid processed foods.  Eat more fresh foods.  Fresh  "fruits and vegetables are naturally low in sodium, as well as frozen vegetables and fruits that have no added juices or sauces.  Fresh meats are lower in sodium than processed meats, such as sutton, sausage, and hotdogs.  Read the nutrition label or ask your  to help you find a fresh meat that is low in sodium.  Eat less salt--at the table and when cooking.  A single teaspoon of table salt has 2,300 mg of sodium.  Leave the salt out of recipes for pasta, casseroles, and soups.  Ask your RDN how to cook your favorite recipes without sodium  Be a smart .  Look for food packages that say “salt-free” or “sodium-free.” These items contain less than 5 milligrams of sodium per serving.  “Very low-sodium” products contain less than 35 milligrams of sodium per serving.  “Low-sodium” products contain less than 140 milligrams of sodium per serving.   Beware of “reduced salt” or \"reduced sodium\" products.  These items may still be high in sodium. Check the nutrition label.   Add flavors to your food without adding sodium.  Try lemon juice, lime juice, fruit juice or vinegar.    Dry or fresh herbs add flavor. Try basil, bay leaf, dill, rosemary, parsley, bria, dry mustard, nutmeg, thyme, and paprika.  Pepper, red pepper flakes, and cayenne pepper can add spice to your meals without adding sodium. Hot sauce contains sodium, but if you use just a drop or two, it will not add up to much.  Buy a sodium-free seasoning blend or make your own at home.     Additional Lifestyle Tips    Achieve and maintain a healthy weight.  Talk with your RDN or your doctor about what is a healthy weight for you.  Set goals to reach and maintain that weight.   To lose weight, reduce your calorie intake along with increasing your physical activity. A weight loss of 10 to 15 pounds could reduce LDL-cholesterol by 5 milligrams per deciliter.  Participate in physical activity.    Talk with your health care team to find out what types of " physical activity are best for you. Set a plan to get about 30 minutes of exercise on most days.          Nutrition Counseling  Our expert team offers:   Medical Nutrition Therapy for Chronic Conditions   Weight Management   Diabetes Education and Management   Wellness Services   Body Composition Measurements   Gastrointestinal Health    N[facility]ing Services are located at:  [address], [place] [address]  For more information and to schedule a consultation, please call [phone].  A physician referral may be required by your insurance for coverage.

## 2025-06-06 NOTE — CONSULTS
Reason for Consult:  Asked by Dr Israel Zepeda M.D. to see this patient with stemi    CC: chest pain    HPI:      70 year old man PMH DVT/PE on xarelto, solitary kidney post nephrectomy due to neoplasm nos, HLD presents with chest pressure. Onset 30min prior to EMS arrival. On EKG in field found inf stemi and prealerted. Aspirin loaded. Ongoing chest pressure on presentation to [facility]. Patient took xarelto this past morning. No toxic social habits. Retired .     Medications / Drug list prior to admission:  Medications Ordered Prior to Encounter[1]    Current list of administered Medications:  Current Medications[2]    Past Medical History[3]    Past Surgical History[4]    No family history on file.  Patient family history was personally reviewed, no pertinent family history to current presentation    Social History     Socioeconomic History    Marital status:      Spouse name: Not on file    Number of children: Not on file    Years of education: Not on file    Highest education level: Not on file   Occupational History    Not on file   Tobacco Use    Smoking status: Never    Smokeless tobacco: Never   Substance and Sexual Activity    Alcohol use: Not on file    Drug use: Not on file    Sexual activity: Not on file   Other Topics Concern    Not on file   Social History Narrative    Not on file     Social Drivers of Health     Financial Resource Strain: Not on File (8/24/2019)    Received from [employer]    Financial Resource Strain     Financial Resource Strain: 0   Food Insecurity: Not on File (8/24/2019)    Received from [employer]    Food Insecurity     Food: 0   Transportation Needs: Not on File (8/24/2019)    Received from [employer]    Transportation Needs     Transportation: 0   Physical Activity: Not on File (8/24/2019)    Received from [employer]    Physical Activity     Physical Activity: 0   Stress: Not on File (8/24/2019)    Received from [employer]    Stress     Stress: 0   Social Connections: Not on File  (2019)    Received from [employer]     Social Connections and Isolation: 0   Intimate Partner Violence: Not on file   Housing Stability: Not on File (2019)    Received from [facility] [employer]    Housing Stability     Housin       ALLERGIES:  Allergies[5]    Review of systems:  A complete review of symptoms was reviewed with patient. This is reviewed in H&P and PMH. ALL OTHERS reviewed and negative    Physical exam:  No data found.  General: No acute distress.   EYES: no jaundice  HEENT: OP clear   Neck: No bruits No JVD.   CVS:  RRR. S1 + S2. No M/R/G. No edema.  Resp: CTAB. No wheezing or crackles/rhonchi.  Abdomen: Soft, NT, ND,  Skin: Grossly nothing acute no obvious rashes  Neurological: Alert, Moves all extremities, no cranial nerve defects on limited exam  Extremities: Pulse 2+ in b/l LE. No cyanosis.     Data:  Laboratory studies personally reviewed by me:  No results found for this or any previous visit (from the past 24 hours).      All pertinent features of laboratory and imaging reviewed including primary images where applicable      Active Problems:    * No active hospital problems. *  Resolved Problems:    * No resolved hospital problems. *      Assessment / Plan:  70 year old man PMH DVT/PE on xarelto, solitary kidney post nephrectomy due to neoplasm nos, HLD presents with chest pressure found ACS with inf STEMI.    -aspirin and hep gtt; likely triple therapy for one month followed by dapt equivalent p2y12i and doac  -urgent LHC  -TTE  -ACE/ARB, BB as tolerated  -HI statin  -further recommendations pending results    It is my pleasure to participate in the care of Mr. Martínez.  Please do not hesitate to contact me with questions or concerns.    Romel Brenner MD  Cardiologist [facility]         [1]   No current facility-administered medications on file prior to encounter.     Current Outpatient Medications on File Prior to Encounter   Medication  Sig Dispense Refill    allopurinol (ZYLOPRIM) 300 MG Tab Take 300 mg by mouth every morning.      simvastatin (ZOCOR) 20 MG Tab Take 20 mg by mouth every morning.     [2]   Current Facility-Administered Medications:     VERAPAMIL HCL 2.5 MG/ML IV SOLN, , , ,     HEPARIN SODIUM (PORCINE) 1000 UNIT/ML INJ SOLN, , , ,     HEPARIN (PORCINE) IN NACL 2000-0.9 UNIT/L-% IV SOLN, , , ,     NITROGLYCERIN 2 MG IV SOLN, , , ,     Current Outpatient Medications:     allopurinol (ZYLOPRIM) 300 MG Tab, Take 300 mg by mouth every morning., Disp: , Rfl:     simvastatin (ZOCOR) 20 MG Tab, Take 20 mg by mouth every morning., Disp: , Rfl:   [3] No past medical history on file.  [4] No past surgical history on file.  [5]   Allergies  Allergen Reactions    Penicillins     Penicillin G Unspecified     Unknown reaction as a child

## 2025-06-06 NOTE — ED PROVIDER NOTES
ER Provider Note    Scribed for Dr. Israel Zepeda MD. by Hoa Mays. 6/6/2025  1:56 AM    Primary Care Provider: Eleazar Murphy M.D.    CHIEF COMPLAINT  No chief complaint on file.    EXTERNAL RECORDS REVIEWED      HPI/ROS  LIMITATION TO HISTORY   Select: : None    OUTSIDE HISTORIAN(S):  EMS was present at bedside to provide patient history.     Hemant Martínez is a 70 y.o. male who presents to the ED as a STEMI onset 50 minutes ago. Patient reports he was resting when he began endorsing chest pain that radiates to his bilateral arms. He denies heart history. Patient takes Xarelto daily, unsure of dosage.     PAST MEDICAL HISTORY  Past Medical History[1]    SURGICAL HISTORY  Past Surgical History[2]    FAMILY HISTORY  No family history on file.    SOCIAL HISTORY   reports that he has never smoked. He has never used smokeless tobacco.    CURRENT MEDICATIONS  Previous Medications    ALLOPURINOL (ZYLOPRIM) 300 MG TAB    Take 300 mg by mouth every morning.    SIMVASTATIN (ZOCOR) 20 MG TAB    Take 20 mg by mouth every morning.       ALLERGIES  Penicillins and Penicillin g    PHYSICAL EXAM  Ht 1.829 m (6')   Wt 84.8 kg (186 lb 15.2 oz)   BMI 25.35 kg/m²   Physical Exam  Vitals and nursing note reviewed.   Constitutional:       Appearance: He is well-developed.   HENT:      Head: Normocephalic.   Cardiovascular:      Rate and Rhythm: Normal rate and regular rhythm.      Heart sounds: No murmur heard.  Pulmonary:      Effort: Pulmonary effort is normal.      Breath sounds: Normal breath sounds.   Abdominal:      Palpations: Abdomen is soft.      Tenderness: There is no abdominal tenderness.   Musculoskeletal:         General: Normal range of motion.      Right lower leg: No edema.      Left lower leg: No edema.   Skin:     General: Skin is warm.   Neurological:      General: No focal deficit present.      Mental Status: He is alert and oriented to person, place, and time.       DIAGNOSTIC STUDIES & PROCEDURES    Labs:    Results for orders placed or performed during the hospital encounter of 06/06/25   CBC with Differential    Collection Time: 06/06/25  1:41 AM   Result Value Ref Range    WBC 4.2 (L) 4.8 - 10.8 K/uL    RBC 4.79 4.70 - 6.10 M/uL    Hemoglobin 14.9 14.0 - 18.0 g/dL    Hematocrit 43.9 42.0 - 52.0 %    MCV 91.6 81.4 - 97.8 fL    MCH 31.1 27.0 - 33.0 pg    MCHC 33.9 32.3 - 36.5 g/dL    RDW 45.4 35.9 - 50.0 fL    Platelet Count 89 (L) 164 - 446 K/uL    MPV 10.1 9.0 - 12.9 fL    Neutrophils-Polys 46.80 44.00 - 72.00 %    Lymphocytes 33.20 22.00 - 41.00 %    Monocytes 8.10 0.00 - 13.40 %    Eosinophils 10.00 (H) 0.00 - 6.90 %    Basophils 1.40 0.00 - 1.80 %    Immature Granulocytes 0.50 0.00 - 0.90 %    Nucleated RBC 0.00 0.00 - 0.20 /100 WBC    Neutrophils (Absolute) 1.96 1.82 - 7.42 K/uL    Lymphs (Absolute) 1.39 1.00 - 4.80 K/uL    Monos (Absolute) 0.34 0.00 - 0.85 K/uL    Eos (Absolute) 0.42 0.00 - 0.51 K/uL    Baso (Absolute) 0.06 0.00 - 0.12 K/uL    Immature Granulocytes (abs) 0.02 0.00 - 0.11 K/uL    NRBC (Absolute) 0.00 K/uL   Complete Metabolic Panel (CMP)    Collection Time: 06/06/25  1:41 AM   Result Value Ref Range    Sodium 139 135 - 145 mmol/L    Potassium 4.1 3.6 - 5.5 mmol/L    Chloride 107 96 - 112 mmol/L    Co2 20 20 - 33 mmol/L    Anion Gap 12.0 7.0 - 16.0    Glucose 113 (H) 65 - 99 mg/dL    Bun 21 8 - 22 mg/dL    Creatinine 1.54 (H) 0.50 - 1.40 mg/dL    Calcium 9.3 8.5 - 10.5 mg/dL    Correct Calcium 9.6 8.5 - 10.5 mg/dL    AST(SGOT) 24 12 - 45 U/L    ALT(SGPT) 15 2 - 50 U/L    Alkaline Phosphatase 80 30 - 99 U/L    Total Bilirubin 0.3 0.1 - 1.5 mg/dL    Albumin 3.6 3.2 - 4.9 g/dL    Total Protein 5.9 (L) 6.0 - 8.2 g/dL    Globulin 2.3 1.9 - 3.5 g/dL    A-G Ratio 1.6 g/dL   proBrain Natriuretic Peptide, NT (BNP)    Collection Time: 06/06/25  1:41 AM   Result Value Ref Range    NT-proBNP 100 0 - 125 pg/mL   Troponins NOW    Collection Time: 06/06/25  1:41 AM   Result Value Ref Range    Troponin T 17 6 -  19 ng/L   ESTIMATED GFR    Collection Time: 25  1:41 AM   Result Value Ref Range    GFR (CKD-EPI) 48 (A) >60 mL/min/1.73 m 2   IMMATURE PLT FRACTION    Collection Time: 25  1:41 AM   Result Value Ref Range    Imm. Plt Fraction 3.7 0.6 - 13.1 %   EKG    Collection Time: 25  2:58 AM   Result Value Ref Range    Report       [facility] Emergency Dept.    Test Date:  2025  Pt Name:    MALLORY CHRISTINA                Department: ER  MRN:        [id number]                      [place]       [facility] [place]:     Male                         Technician: [id number]  :        [id number]                   Requested By:MARLEE ROTHMAN  Order #:    [id number]                    Reading MD: Marlee Rothman    Measurements  Intervals                                Axis  Rate:       59                           P:          71  CT:         168                          QRS:        -16  QRSD:       91                           T:          83  QT:         418  QTc:        414    Interpretive Statements  Sinus bradycardia, STEMI  Inferoposterior infarct, acute  Lateral infarct, acute  Probable RV involvement, suggest recording right precordial leads  No previous ECG available for comparison  Electronically Signed On 2025 02:58:04 PDT by Marlee Rothman       All labs reviewed by me.    EKG:   I have independently interpreted this EKG     Radiology:   The attending Emergency Physician has independently interpreted the diagnostic imaging associated with this visit and is awaiting the final reading from the radiologist, which will be displayed below.    Preliminary interpretation is a follows: Chest x-ray no acute process  Radiologist interpretation:    DX-CHEST-LIMITED (1 VIEW)   Final Result      No evidence of acute cardiopulmonary process.      CL-LEFT HEART CATHETERIZATION WITH POSSIBLE INTERVENTION    (Results Pending)   EC-ECHOCARDIOGRAM COMPLETE W/O CONT    (Results Pending)        CRITICAL CARE  The  very real possibilty of a deterioration of this patient's condition required the highest level of my preparedness for sudden, emergent intervention.  I provided critical care services, which included medication orders, frequent reevaluations of the patient's condition and response to treatment, ordering and reviewing test results, and discussing the case with various consultants.  The critical care time associated with the care of the patient was 31 minutes. Review chart for interventions. This time is exclusive of any other billable procedures.     I, Hoa Mays (Angella), am scribing for, and in the presence of, Israel Zepeda M.D..    COURSE & MEDICAL DECISION MAKING    INITIAL ASSESSMENT AND PLAN  Care Narrative:         1:56 AM - Patient seen and evaluated at bedside.  70-year-old male presenting with sudden onset typical chest pain STEMI alerted in the field by EMS when twelve-lead showed inferior MI.  Repeat EKG here confirms inferior STEMI.  Cardiology at bedside also agrees and has paged the intensivist the interventionalist for catheterization.  Patient already aspirin loaded by EMS heparin drip to be initiated at this time.    ADDITIONAL PROBLEM LIST AND DISPOSITION  Past Medical History[3]               DISPOSITION AND DISCUSSIONS  I have discussed management of the patient with the following physicians and MARIBELL's: Dr. Brenner     Discussion of management with other [facility] or appropriate source(s): None     Escalation of care considered, and ultimately not performed: .    Barriers to care at this time, including but not limited to: None.     Decision tools and prescription drugs considered including, but not limited to: .    DISPOSITION:  Patient will be hospitalized by Dr. Brenner in guarded condition.    FINAL IMPRESSION   1. STEMI involving left circumflex coronary artery (HCC)    2. Status post insertion of drug eluting coronary artery stent        Electronically signed by: Hoa Rico),  6/6/2025    IIsrael M.D. personally performed the services described in this documentation, as scribed by Hoa Mays in my presence, and it is both accurate and complete.    The note accurately reflects work and decisions made by me.  Israel Zepeda M.D.  6/6/2025  2:59 AM         [1] No past medical history on file.  [2] No past surgical history on file.  [3] No past medical history on file.

## 2025-06-06 NOTE — PROGRESS NOTES
Patient arrived at 0133, alert and oriented. Patient woke up out of sleep at home with chest pressure and pain radiating down both arms. Called EMS, received 100mcg Fent, 5 doses of nitro and 325mg aspirin en route. On arrival complaining of 5/10 chest pressure.    Meds given post arrival:   0141: 4000 Heparin   0152: 4mg Morphine   0152: 6mg Zofran     STEMI confirmed by Cardiologist Elidia, awaiting cath lab arrival.

## 2025-06-06 NOTE — CARE PLAN
The patient is Watcher - Medium risk of patient condition declining or worsening    Shift Goals  Clinical Goals: Rest, TR band  Patient Goals: Rest  Family Goals: Updates    Progress made toward(s) clinical / shift goals:    Problem: Pain - Standard  Goal: Alleviation of pain or a reduction in pain to the patient’s comfort goal  Outcome: Progressing     Problem: Fall Risk  Goal: Patient will remain free from falls  Outcome: Progressing     Problem: Skin Integrity  Goal: Risk for impaired skin integrity will decrease  Outcome: Progressing       Patient is not progressing towards the following goals:

## 2025-06-06 NOTE — PROGRESS NOTES
"Critical care medicine Progress Note    Date of admission  6/6/2025    Chief Complaint  70M admitted 6/6/2025 with \"hx of DVT/PE on xarelto, solitary kidney s/p left nephrectomy due kidney CA (no chemo) in 2020, hyperlipidemia who presented to ED after had chest pain. ECG c/w inferior/posterior/lateral infarct. STEMI alert was activate and pt taken to C. ASA given, heparin gtt started. LHC showed 100% occluded Lcx s/p PCI TERRI with CIPRIANO-2 flow. 80% LAD. Mild RCA. LVEF 40%, LVEDP 25. Upon ICU arrival, hemodynamically stable. NSR. Pt denies chest pain\"    Hospital Course  6/5 -STEMI s/p successful 3 TERRI(s) to LCx, was bradycardic responsive to atropine    Interval Problem Update  Reviewed last 24 hour events:  Neuro: No acute issues  Cards: NSR 50-76 , BP: /60-80s, LVEF 40% with elevated LVEDP 25 mmHg, awaiting formal TTE  DAPT, statin, hold on initiating BB in setting of bradycardia, hold ARB in setting of SAMUEL  Pulm: on RA, chest x-ray without clear infiltrate or pleural effusions  GI: Cardiac diet   Renal: Cr: 1.54 (prior baseline 1.53 in 1/22/2025), UOP: None documented yet  ID/Abx: WBC 4.2, H/H: 14.9/43.9, plts: 89  High eos (chronic)  Endo: BS at goal  Heme: Chronic thrombocytopenia, 89K, otherwise H&H stable  DVT ppx: Reinitiating Xarelto  GI ppx: Cardiac diet  Labs/imaging: Reviewed  Lines/tubes/Drains: None    Review of Systems  Review of Systems   Constitutional: Negative.    HENT: Negative.     Respiratory:  Negative for cough, sputum production and shortness of breath.    Cardiovascular:  Positive for chest pain.   Gastrointestinal:  Negative for nausea and vomiting.        Vital Signs for last 24 hours   Temp:  [35.7 °C (96.2 °F)-35.9 °C (96.7 °F)] 35.9 °C (96.7 °F)  Pulse:  [53-78] 61  Resp:  [8-24] 22  BP: ()/(57-85) 130/75  SpO2:  [90 %-100 %] 95 %    Hemodynamic parameters for last 24 hours       Respiratory Information for the last 24 hours       Physical Exam   Physical " Exam  Constitutional:       Appearance: Normal appearance.   HENT:      Head: Normocephalic and atraumatic.   Eyes:      Extraocular Movements: Extraocular movements intact.      Pupils: Pupils are equal, round, and reactive to light.   Cardiovascular:      Rate and Rhythm: Regular rhythm. Bradycardia present.   Pulmonary:      Effort: Pulmonary effort is normal.      Breath sounds: Normal breath sounds.   Abdominal:      General: Abdomen is flat.      Palpations: Abdomen is soft.   Musculoskeletal:      Right lower leg: No edema.      Left lower leg: No edema.   Skin:     General: Skin is warm and dry.   Neurological:      General: No focal deficit present.      Mental Status: He is alert and oriented to person, place, and time.         Medications  Current Medications[1]    Fluids    Intake/Output Summary (Last 24 hours) at 6/6/2025 1217  Last data filed at 6/6/2025 0800  Gross per 24 hour   Intake 658.72 ml   Output 500 ml   Net 158.72 ml       Laboratory          Recent Labs     06/06/25  0141   SODIUM 139   POTASSIUM 4.1   CHLORIDE 107   CO2 20   BUN 21   CREATININE 1.54*   CALCIUM 9.3     Recent Labs     06/06/25  0141   ALTSGPT 15   ASTSGOT 24   ALKPHOSPHAT 80   TBILIRUBIN 0.3   GLUCOSE 113*     Recent Labs     06/06/25  0141   WBC 4.2*   NEUTSPOLYS 46.80   LYMPHOCYTES 33.20   MONOCYTES 8.10   EOSINOPHILS 10.00*   BASOPHILS 1.40   ASTSGOT 24   ALTSGPT 15   ALKPHOSPHAT 80   TBILIRUBIN 0.3     Recent Labs     06/06/25  0141   RBC 4.79   HEMOGLOBIN 14.9   HEMATOCRIT 43.9   PLATELETCT 89*       C 6/6:  IMPRESSION:  1.  Occluded codominant left circumflex status post successful IVUS guided PCI deploying overlapping Synergy 4 x 12 mm, 3 x 28 mm and 2.5 x 12 mm Evelin.  Heavy plaque burden noted on imaging  2.  Residual severe mid LAD disease, CIPRIANO-3 flow  3.  Reduced estimated LVEF 40% with elevated resting LVEDP 25 mmHg no significant transaortic gradient on pullback      Imaging  X-Ray:  I have personally  reviewed the images and compared with prior images.  EKG:  inferorposterior STEMI, now has Q waves without ST changes    Assessment/Plan  * STEMI (ST elevation myocardial infarction) (HCC)- (present on admission)  Assessment & Plan  Inferior STEMI s/p TERRI x3 to Lcx, needs staged PCI prior to discharge of LAD  LVEF 40%, LVEDP 25 mmHG, follow up formal TTE  Continue DAPT, cardiology consulted, appreciate recs, likely staged [facility] tomorrow  Hold Xarelto for now given staged PCI - increase to therapeutic dose (was on 10mg outpatient?) when restarting  High-dose atorvastatin  Start GDMT when appropriate (hold BB in setting of bradycardia/inferior infarct, and ARB in setting of contrast, etc)  Continue tele for misael arrhythmias     CAD (coronary artery disease)  Assessment & Plan  See STEMI    Hx of pulmonary embolus  Assessment & Plan  Hx of PE/DVT 2017 - per chart review, was provoked in setting on ankle injury.   Hold Xarelto until after staged PCI    History of renal cell cancer  Assessment & Plan  Papillary renal cell carcinoma s/p nephrectomy 5/22/19, now with solitary kidney  Follow[facility] by Dr. Sanchez at [facility]  Has underlying CKD  Trend Cr    CKD (chronic kidney disease)- (present on admission)  Assessment & Plan  Cr appears at baseline, Cr: 1.5  Trend BMP  Renally dose medications  Avoid nephrotoxins  Hold off on starting ACE/ARB in light of probable repeat [facility]    HLD (hyperlipidemia)- (present on admission)  Assessment & Plan  Change home lovastatin to high dose atorvastatin  HI statin / PCSK9-I: Target LDL < 55 and TG < 150       Gout, unspecified- (present on admission)  Assessment & Plan  Continue home allopurinol 300 mg daily  May need renal adjustment pending Cr clearance    Thrombocytopenia (HCC)- (present on admission)  Assessment & Plan  History of chronic thrombocytopenia dating back to 2013 - (90-100k).  Followed by Dr. Sanchez  CTM      Sarcoidosis- (present on admission)  Assessment & Plan  history  of sarcoidosis first diagnosed in the 1980s in setting of incidental mediastinal adenopathy. He had no treatment until the 1990s when he was noted to have abnormal kidney function for which he was treated with steroids as it was thought he had kidney involvement of his sarcoid.            VTE:  Heparin for now  Ulcer: Not Indicated  Lines: None    I have performed a physical exam and reviewed and updated ROS and Plan today (6/6/2025). In review of yesterday's note (6/5/2025), there are no changes except as documented above.     Discussed patient condition and risk of morbidity and/or mortality with RN, RT, Pharmacy, Charge nurse / hot rounds, Patient, and cardiology  Critical care time = 45 minutes in directly providing and coordinating critical care and extensive data review.  No time overlap and excludes procedures.         [1]   Current Facility-Administered Medications   Medication Dose Route Frequency Provider Last Rate Last Admin    [START ON 6/7/2025] clopidogrel (Plavix) tablet 75 mg  75 mg Oral DAILY Ihsan Velasco M.D.        aspirin EC tablet 81 mg  81 mg Oral DAILY Martin Vogt M.D.   81 mg at 06/06/25 0502    atorvastatin (Lipitor) tablet 40 mg  40 mg Oral Q EVENING Martin Vogt M.D.        allopurinol (Zyloprim) tablet 300 mg  300 mg Oral QAM Martin Vogt M.D.   300 mg at 06/06/25 0502    [Held by provider] losartan (Cozaar) tablet 25 mg  25 mg Oral Q DAY Martin Vogt M.D.        [Held by provider] rivaroxaban (Xarelto) tablet 20 mg  20 mg Oral PM MEAL Bernadine Gamino M.D.

## 2025-06-06 NOTE — ASSESSMENT & PLAN NOTE
Inferior STEMI s/p TERRI x3 to Lcx, needs staged PCI prior to discharge of LAD  LVEF 40%, LVEDP 25 mmHG, follow up formal TTE  Continue DAPT, cardiology consulted, appreciate recs, likely staged LHC tomorrow  Hold Xarelto for now given staged PCI - increase to therapeutic dose (was on 10mg outpatient?) when restarting  High-dose atorvastatin  Start GDMT when appropriate (hold BB in setting of bradycardia/inferior infarct, and ARB in setting of contrast, etc)  Continue tele for misael arrhythmias

## 2025-06-06 NOTE — PROGRESS NOTES
[facility] ICU Progress Note      Admit Date: 6/6/2025    Resident: Windy Cordoba M.D.   Attending:  Bernadine Hernandez*    Hospital Course (carried forward and updated):  Hospital Day: 0    Hemant Martínez is a 70M with hx of DVT/PE on xarelto, solitary kidney s/p left nephrectomy due kidney CA (no chemo) in 2020, [facility] who presented to ED after had chest pain. ECG c/w inferior/posterior/lateral infarct. STEMI alert was activate and pt taken to LHC. ASA given, heparin gtt started. LHC showed 100% occluded Lcx s/p PCI TERRI with CIPRIANO-2 flow. 80% LAD. Mild RCA. LVEF 40%, LVEDP 25. Upon ICU arrival, hemodynamically stable. NSR.     Interval Events:    No acute events overnight.  This morning did have some chest pain but resolving.   CXR wnl  LDL 74  Cr 0.99 in 2019 -> 1.54 today, unclear recent baseline  Pending formal TTE  Trop 2316 -> 5302 pending repeat in 4 hrs  On xarelto for hx of DVT/PE; hold xarelto in setting of possible staged LHC and start heparin, per cardio also start asa and plavix (triple therapy for 1 mo followed by dapt with plavix/xarelto), high dose statin  Likely transfer to tele pending cardio recs    Review of Systems   Respiratory:  Negative for cough and shortness of breath.    Cardiovascular:  Positive for chest pain (resolving).   Gastrointestinal:  Negative for nausea and vomiting.   Neurological:  Negative for dizziness, weakness and headaches.   All other systems reviewed and are negative.       Vitals:  Temp:  [35.7 °C (96.2 °F)] 35.7 °C (96.2 °F)  Pulse:  [54-78] 68  Resp:  [12-20] 12  BP: ()/(57-85) 94/57  SpO2:  [91 %-93 %] 91 %  O2 therapy: Pulse Oximetry: 91 %, O2 Delivery Device: None - Room Air          I/O's:  Intake/Output                         06/05/25 0700 - 06/06/25 0659 (Not Admitted) 06/06/25 0700 - 06/07/25 0659     [id number] 6528-2108 Total 0821-8621 6588-3317 Total                 Intake    I.V.  --  44.3 44.3  --  -- --    Volume (mL) (NS infusion) -- 44.3 44.3 -- --  --    Total Intake -- 44.3 44.3 -- -- --       Output    Total Output -- -- -- -- -- --       Net I/O     -- 44.3 44.3 -- -- --             Physical Exam:  Physical Exam  Constitutional:       General: He is not in acute distress.     Appearance: Normal appearance. He is not ill-appearing.   HENT:      Head: Normocephalic and atraumatic.      Right Ear: External ear normal.      Left Ear: External ear normal.      Nose: Nose normal.      Mouth/Throat:      Mouth: Mucous membranes are moist.      Pharynx: Oropharynx is clear.   Eyes:      Extraocular Movements: Extraocular movements intact.      Conjunctiva/sclera: Conjunctivae normal.      Pupils: Pupils are equal, round, and reactive to light.   Cardiovascular:      Rate and Rhythm: Regular rhythm. Bradycardia present.      Pulses: Normal pulses.      Heart sounds: Normal heart sounds. No murmur heard.     No friction rub. No gallop.   Pulmonary:      Effort: Pulmonary effort is normal. No respiratory distress.      Breath sounds: Normal breath sounds. No wheezing, rhonchi or rales.   Abdominal:      General: Abdomen is flat. Bowel sounds are normal. There is no distension.      Palpations: Abdomen is soft.      Tenderness: There is no abdominal tenderness. There is no guarding or rebound.   Musculoskeletal:         General: No swelling or tenderness. Normal range of motion.      Cervical back: Normal range of motion and neck supple. No tenderness.      Right lower leg: No edema.      Left lower leg: No edema.   Lymphadenopathy:      Cervical: No cervical adenopathy.   Skin:     General: Skin is warm and dry.      Capillary Refill: Capillary refill takes less than 2 seconds.   Neurological:      General: No focal deficit present.      Mental Status: He is alert and oriented to person, place, and time. Mental status is at baseline.   Psychiatric:         Mood and Affect: Mood normal.         Behavior: Behavior normal.         Labs:      Recent Labs      06/06/25  0141   RBC 4.79   HEMOGLOBIN 14.9   HEMATOCRIT 43.9   PLATELETCT 89*     Recent Labs     06/06/25  0141   WBC 4.2*   NEUTSPOLYS 46.80   LYMPHOCYTES 33.20   MONOCYTES 8.10   EOSINOPHILS 10.00*   BASOPHILS 1.40   ASTSGOT 24   ALTSGPT 15   ALKPHOSPHAT 80   TBILIRUBIN 0.3     Recent Labs     06/06/25  0141   SODIUM 139   POTASSIUM 4.1   CHLORIDE 107   CO2 20   BUN 21   CREATININE 1.54*   CALCIUM 9.3     Recent Labs     06/06/25  0141   ALTSGPT 15   ASTSGOT 24   ALKPHOSPHAT 80   TBILIRUBIN 0.3   GLUCOSE 113*       Imaging:  I have reviewed the relevant diagnostic imaging.    Microbiology:  I have reviewed the relevant microbiology.    Problem List:  Principal Problem:    STEMI (ST elevation myocardial infarction) (HCC) (POA: Yes)  Active Problems:    Gout, unspecified (POA: Yes)    HLD (hyperlipidemia) (POA: Yes)    CKD (chronic kidney disease) (POA: Yes)    History of renal cell cancer (POA: Unknown)    Hx of pulmonary embolus (POA: Unknown)    CAD (coronary artery disease) (POA: Unknown)  Resolved Problems:    * No resolved hospital problems. *      # ICU Checklist  -DVT ppx: heparin (hold xarelto)  -GI ppx: n/a  -Nutrition: cardiac diet  -Lines/drains: PIV    ASSESSEMENT and PLAN:    * STEMI (ST elevation myocardial infarction) (HCC)- (present on admission)  Assessment & Plan  Patient activated as a code STEMI or EKG findings on arrival  Status post left heart cath showing occluded Lcx s/p PCI TERRI x3 with CIPRIANO 2, 80% LAD stenosis   Started on DAPT (heparin + plavix), holding xarelto (hx of DVT) for possible staged LAD PCI p ending cardio recs  High-dose atorvastatin, lipid panel pending  Start GDMT when appropriate (hold BB in setting of bradycardia/inferior infarct, and ARB in setting of contrast, etc)  Cardiology following, recommends staged LAD PCI before discharge/outpatient    CAD (coronary artery disease)  Assessment & Plan  See STEMI    Hx of pulmonary embolus  Assessment & Plan  Hx of PE/DVTs 2017,  provoked in setting of ankle injury; on chronic anticoagulation with xarelto  - hold xarelto pending cardio recs regarding staged LAD PCI before discharge/outpatient    History of renal cell cancer  Assessment & Plan  Hx of papillary RCC s/p nephrectomy 2019, now has solitary kidney, follows with dr rosales maravilla  With CKD, now with possible SAMUEL  See CKD    CKD (chronic kidney disease)- (present on admission)  Assessment & Plan  Possible SAMUEL on CKD, baseline creatinine from 2019 well below current threshold  Trend BMP  Renally dose medications  Avoid nephrotoxins  Hold off on starting ACE/ARB at this point carlene in setting of repeat LHC pending    HLD (hyperlipidemia)- (present on admission)  Assessment & Plan  Change home lovastatin to high intensity statin per cardiology recs  LDL 74 on 6/6  HI statin / PCSK9-I: Target LDL < 55 and TG < 150     Gout, unspecified- (present on admission)  Assessment & Plan  Continue home allopurinol    Sarcoidosis- (present on admission)  Assessment & Plan  Hx of sarcoidosis dx 1980s in setting of incidental mediastinal adenopathy. He had no treatment until the 1990s when he was noted to have abnormal kidney function for which he was tx with steroids as it was thought he had kidney involvement of his sarcoid      Windy Cordoba M.D.  PGY-2 Internal Medicine

## 2025-06-06 NOTE — ASSESSMENT & PLAN NOTE
History of chronic thrombocytopenia dating back to 2013 - (90-100k).  Followed by Dr. Laura FRAZIERM

## 2025-06-06 NOTE — ASSESSMENT & PLAN NOTE
Change home lovastatin to high dose atorvastatin  HI statin / PCSK9-I: Target LDL < 55 and TG < 150

## 2025-06-07 ENCOUNTER — APPOINTMENT (OUTPATIENT)
Dept: CARDIOLOGY | Facility: MEDICAL CENTER | Age: 70
DRG: 321 | End: 2025-06-07
Attending: NURSE PRACTITIONER
Payer: MEDICARE

## 2025-06-07 PROBLEM — I25.10 CORONARY ARTERY DISEASE DUE TO LIPID RICH PLAQUE: Chronic | Status: ACTIVE | Noted: 2025-06-06

## 2025-06-07 PROBLEM — I21.21 ST ELEVATION MYOCARDIAL INFARCTION INVOLVING LEFT CIRCUMFLEX CORONARY ARTERY (HCC): Chronic | Status: ACTIVE | Noted: 2025-06-06

## 2025-06-07 PROBLEM — I25.83 CORONARY ARTERY DISEASE DUE TO LIPID RICH PLAQUE: Chronic | Status: ACTIVE | Noted: 2025-06-06

## 2025-06-07 PROBLEM — Z95.5 PRESENCE OF DRUG COATED STENT IN LEFT CIRCUMFLEX CORONARY ARTERY: Chronic | Status: ACTIVE | Noted: 2025-06-06

## 2025-06-07 LAB
ACT BLD: 245 SEC (ref 74–137)
ACT BLD: 245 SEC (ref 74–137)
ACT BLD: 268 SEC (ref 74–137)
ALBUMIN SERPL BCP-MCNC: 3.4 G/DL (ref 3.2–4.9)
ALBUMIN/GLOB SERPL: 1.7 G/DL
ALP SERPL-CCNC: 79 U/L (ref 30–99)
ALT SERPL-CCNC: 28 U/L (ref 2–50)
ANION GAP SERPL CALC-SCNC: 10 MMOL/L (ref 7–16)
AST SERPL-CCNC: 142 U/L (ref 12–45)
BILIRUB SERPL-MCNC: 0.6 MG/DL (ref 0.1–1.5)
BUN SERPL-MCNC: 18 MG/DL (ref 8–22)
CALCIUM ALBUM COR SERPL-MCNC: 9 MG/DL (ref 8.5–10.5)
CALCIUM SERPL-MCNC: 8.5 MG/DL (ref 8.5–10.5)
CHLORIDE SERPL-SCNC: 107 MMOL/L (ref 96–112)
CO2 SERPL-SCNC: 20 MMOL/L (ref 20–33)
CREAT SERPL-MCNC: 1.4 MG/DL (ref 0.5–1.4)
ERYTHROCYTE [DISTWIDTH] IN BLOOD BY AUTOMATED COUNT: 45.7 FL (ref 35.9–50)
EST. AVERAGE GLUCOSE BLD GHB EST-MCNC: 108 MG/DL
GFR SERPLBLD CREATININE-BSD FMLA CKD-EPI: 53 ML/MIN/1.73 M 2
GLOBULIN SER CALC-MCNC: 2 G/DL (ref 1.9–3.5)
GLUCOSE SERPL-MCNC: 122 MG/DL (ref 65–99)
HBA1C MFR BLD: 5.4 % (ref 4–5.6)
HCT VFR BLD AUTO: 40.6 % (ref 42–52)
HGB BLD-MCNC: 14.2 G/DL (ref 14–18)
MAGNESIUM SERPL-MCNC: 1.7 MG/DL (ref 1.5–2.5)
MCH RBC QN AUTO: 31.6 PG (ref 27–33)
MCHC RBC AUTO-ENTMCNC: 35 G/DL (ref 32.3–36.5)
MCV RBC AUTO: 90.4 FL (ref 81.4–97.8)
PLATELET # BLD AUTO: 79 K/UL (ref 164–446)
PLATELETS.RETICULATED NFR BLD AUTO: 2.9 % (ref 0.6–13.1)
PMV BLD AUTO: 10.6 FL (ref 9–12.9)
POTASSIUM SERPL-SCNC: 4.3 MMOL/L (ref 3.6–5.5)
PROT SERPL-MCNC: 5.4 G/DL (ref 6–8.2)
RBC # BLD AUTO: 4.49 M/UL (ref 4.7–6.1)
SODIUM SERPL-SCNC: 137 MMOL/L (ref 135–145)
WBC # BLD AUTO: 4.7 K/UL (ref 4.8–10.8)

## 2025-06-07 PROCEDURE — 700102 HCHG RX REV CODE 250 W/ 637 OVERRIDE(OP): Performed by: INTERNAL MEDICINE

## 2025-06-07 PROCEDURE — 93452 LEFT HRT CATH W/VENTRCLGRPHY: CPT | Mod: 26 | Performed by: INTERNAL MEDICINE

## 2025-06-07 PROCEDURE — 700102 HCHG RX REV CODE 250 W/ 637 OVERRIDE(OP)

## 2025-06-07 PROCEDURE — 700117 HCHG RX CONTRAST REV CODE 255: Performed by: INTERNAL MEDICINE

## 2025-06-07 PROCEDURE — 92978 ENDOLUMINL IVUS OCT C 1ST: CPT | Mod: 26,LD | Performed by: INTERNAL MEDICINE

## 2025-06-07 PROCEDURE — 80053 COMPREHEN METABOLIC PANEL: CPT

## 2025-06-07 PROCEDURE — 700105 HCHG RX REV CODE 258: Performed by: INTERNAL MEDICINE

## 2025-06-07 PROCEDURE — 99153 MOD SED SAME PHYS/QHP EA: CPT

## 2025-06-07 PROCEDURE — 99232 SBSQ HOSP IP/OBS MODERATE 35: CPT | Mod: FS | Performed by: INTERNAL MEDICINE

## 2025-06-07 PROCEDURE — 700101 HCHG RX REV CODE 250

## 2025-06-07 PROCEDURE — 93005 ELECTROCARDIOGRAM TRACING: CPT | Mod: TC | Performed by: INTERNAL MEDICINE

## 2025-06-07 PROCEDURE — A9270 NON-COVERED ITEM OR SERVICE: HCPCS | Performed by: INTERNAL MEDICINE

## 2025-06-07 PROCEDURE — 99152 MOD SED SAME PHYS/QHP 5/>YRS: CPT | Performed by: INTERNAL MEDICINE

## 2025-06-07 PROCEDURE — 85055 RETICULATED PLATELET ASSAY: CPT

## 2025-06-07 PROCEDURE — B240ZZ3 ULTRASONOGRAPHY OF SINGLE CORONARY ARTERY, INTRAVASCULAR: ICD-10-PCS | Performed by: INTERNAL MEDICINE

## 2025-06-07 PROCEDURE — 700111 HCHG RX REV CODE 636 W/ 250 OVERRIDE (IP): Mod: JZ | Performed by: STUDENT IN AN ORGANIZED HEALTH CARE EDUCATION/TRAINING PROGRAM

## 2025-06-07 PROCEDURE — 770020 HCHG ROOM/CARE - TELE (206)

## 2025-06-07 PROCEDURE — A9270 NON-COVERED ITEM OR SERVICE: HCPCS

## 2025-06-07 PROCEDURE — 99223 1ST HOSP IP/OBS HIGH 75: CPT | Performed by: INTERNAL MEDICINE

## 2025-06-07 PROCEDURE — 700111 HCHG RX REV CODE 636 W/ 250 OVERRIDE (IP): Mod: JZ

## 2025-06-07 PROCEDURE — 83735 ASSAY OF MAGNESIUM: CPT

## 2025-06-07 PROCEDURE — 85347 COAGULATION TIME ACTIVATED: CPT

## 2025-06-07 PROCEDURE — 92928 PRQ TCAT PLMT NTRAC ST 1 LES: CPT | Mod: LD | Performed by: INTERNAL MEDICINE

## 2025-06-07 PROCEDURE — 700111 HCHG RX REV CODE 636 W/ 250 OVERRIDE (IP)

## 2025-06-07 PROCEDURE — 85027 COMPLETE CBC AUTOMATED: CPT

## 2025-06-07 RX ORDER — MIDAZOLAM HYDROCHLORIDE 1 MG/ML
INJECTION INTRAMUSCULAR; INTRAVENOUS
Status: COMPLETED
Start: 2025-06-07 | End: 2025-06-07

## 2025-06-07 RX ORDER — HYDROMORPHONE HYDROCHLORIDE 1 MG/ML
0.2 INJECTION, SOLUTION INTRAMUSCULAR; INTRAVENOUS; SUBCUTANEOUS EVERY 4 HOURS PRN
Status: DISCONTINUED | OUTPATIENT
Start: 2025-06-07 | End: 2025-06-08 | Stop reason: HOSPADM

## 2025-06-07 RX ORDER — SODIUM CHLORIDE 9 MG/ML
INJECTION, SOLUTION INTRAVENOUS CONTINUOUS
Status: ACTIVE | OUTPATIENT
Start: 2025-06-07 | End: 2025-06-07

## 2025-06-07 RX ORDER — MAGNESIUM SULFATE HEPTAHYDRATE 40 MG/ML
4 INJECTION, SOLUTION INTRAVENOUS ONCE
Status: COMPLETED | OUTPATIENT
Start: 2025-06-07 | End: 2025-06-07

## 2025-06-07 RX ORDER — HEPARIN SODIUM 1000 [USP'U]/ML
INJECTION, SOLUTION INTRAVENOUS; SUBCUTANEOUS
Status: COMPLETED
Start: 2025-06-07 | End: 2025-06-07

## 2025-06-07 RX ORDER — HEPARIN SODIUM 200 [USP'U]/100ML
INJECTION, SOLUTION INTRAVENOUS
Status: COMPLETED
Start: 2025-06-07 | End: 2025-06-07

## 2025-06-07 RX ORDER — LIDOCAINE HYDROCHLORIDE 20 MG/ML
INJECTION, SOLUTION INFILTRATION; PERINEURAL
Status: COMPLETED
Start: 2025-06-07 | End: 2025-06-07

## 2025-06-07 RX ORDER — VERAPAMIL HYDROCHLORIDE 2.5 MG/ML
INJECTION INTRAVENOUS
Status: COMPLETED
Start: 2025-06-07 | End: 2025-06-07

## 2025-06-07 RX ADMIN — SODIUM CHLORIDE: 9 INJECTION, SOLUTION INTRAVENOUS at 15:02

## 2025-06-07 RX ADMIN — FENTANYL CITRATE 25 MCG: 50 INJECTION, SOLUTION INTRAMUSCULAR; INTRAVENOUS at 13:58

## 2025-06-07 RX ADMIN — ATORVASTATIN CALCIUM 40 MG: 40 TABLET, FILM COATED ORAL at 17:13

## 2025-06-07 RX ADMIN — IOHEXOL 72 ML: 350 INJECTION, SOLUTION INTRAVENOUS at 14:16

## 2025-06-07 RX ADMIN — NITROGLYCERIN 10 ML: 20 INJECTION INTRAVENOUS at 12:52

## 2025-06-07 RX ADMIN — CLOPIDOGREL BISULFATE 75 MG: 75 TABLET, FILM COATED ORAL at 05:23

## 2025-06-07 RX ADMIN — ALLOPURINOL 300 MG: 300 TABLET ORAL at 05:22

## 2025-06-07 RX ADMIN — MIDAZOLAM HYDROCHLORIDE 2 MG: 1 INJECTION, SOLUTION INTRAMUSCULAR; INTRAVENOUS at 13:59

## 2025-06-07 RX ADMIN — HEPARIN SODIUM 2000 UNITS: 200 INJECTION, SOLUTION INTRAVENOUS at 12:52

## 2025-06-07 RX ADMIN — VERAPAMIL HYDROCHLORIDE 5 MG: 2.5 INJECTION, SOLUTION INTRAVENOUS at 12:52

## 2025-06-07 RX ADMIN — FENTANYL CITRATE 100 MCG: 50 INJECTION, SOLUTION INTRAMUSCULAR; INTRAVENOUS at 13:03

## 2025-06-07 RX ADMIN — HEPARIN SODIUM: 1000 INJECTION, SOLUTION INTRAVENOUS; SUBCUTANEOUS at 14:11

## 2025-06-07 RX ADMIN — MIDAZOLAM HYDROCHLORIDE 2 MG: 1 INJECTION, SOLUTION INTRAMUSCULAR; INTRAVENOUS at 13:03

## 2025-06-07 RX ADMIN — MAGNESIUM SULFATE HEPTAHYDRATE 4 G: 4 INJECTION, SOLUTION INTRAVENOUS at 07:15

## 2025-06-07 RX ADMIN — LIDOCAINE HYDROCHLORIDE: 20 INJECTION, SOLUTION INFILTRATION; PERINEURAL at 12:52

## 2025-06-07 RX ADMIN — HEPARIN SODIUM: 1000 INJECTION, SOLUTION INTRAVENOUS; SUBCUTANEOUS at 12:52

## 2025-06-07 RX ADMIN — ASPIRIN 81 MG: 81 TABLET, COATED ORAL at 05:22

## 2025-06-07 RX ADMIN — HYDROMORPHONE HYDROCHLORIDE 0.5 MG: 1 INJECTION, SOLUTION INTRAMUSCULAR; INTRAVENOUS; SUBCUTANEOUS at 03:16

## 2025-06-07 RX ADMIN — MIDAZOLAM HYDROCHLORIDE 2 MG: 1 INJECTION, SOLUTION INTRAMUSCULAR; INTRAVENOUS at 13:21

## 2025-06-07 ASSESSMENT — ENCOUNTER SYMPTOMS
CHILLS: 0
LOSS OF CONSCIOUSNESS: 0
DIZZINESS: 0
PALPITATIONS: 0
VOMITING: 0
DEPRESSION: 0
FEVER: 0
TINGLING: 0
CHEST TIGHTNESS: 1
APNEA: 0
WHEEZING: 0
SPUTUM PRODUCTION: 0
HEADACHES: 0
NAUSEA: 0
MYALGIAS: 0
CHOKING: 0
CONSTIPATION: 0
STRIDOR: 0
ABDOMINAL PAIN: 0
WEAKNESS: 0
COUGH: 0
FALLS: 0
SHORTNESS OF BREATH: 0
DIARRHEA: 0

## 2025-06-07 ASSESSMENT — PAIN DESCRIPTION - PAIN TYPE
TYPE: ACUTE PAIN;CHRONIC PAIN
TYPE: ACUTE PAIN
TYPE: ACUTE PAIN;CHRONIC PAIN
TYPE: ACUTE PAIN
TYPE: ACUTE PAIN

## 2025-06-07 ASSESSMENT — FIBROSIS 4 INDEX
FIB4 SCORE: 24.46
FIB4 SCORE: 24.46

## 2025-06-07 NOTE — ASSESSMENT & PLAN NOTE
Culprit lesion noted in left circumflex, 100% occluded, stented on 6/6  Patient also noted to have 80% disease in the mid LAD  Going for Cath Lab again today for additional stenting   Continue dual antiplatelet therapy with aspirin and Plavix  Unable to initiate goal-directed medical therapy at this time due to low blood pressure and heart rate

## 2025-06-07 NOTE — CARE PLAN
The patient is Watcher - Medium risk of patient condition declining or worsening    Shift Goals  Clinical Goals: Pain control, hemodyndamic stability, rest  Patient Goals: pain control, rest  Family Goals: audra    Progress made toward(s) clinical / shift goals:    Problem: Pain - Standard  Goal: Alleviation of pain or a reduction in pain to the patient’s comfort goal  Outcome: Progressing     Problem: Fall Risk  Goal: Patient will remain free from falls  Outcome: Progressing       Patient is not progressing towards the following goals:

## 2025-06-07 NOTE — PROCEDURES
Cardiac Catheterization Procedure Note    DATE: 6/7/2025    : Fili Alonzo MD    PROCEDURES PERFORMED:  Left heart catheterization  Coronary angiography  Intravascular ultrasound of the left anterior descending coronary  Cutting balloon angioplasty and percutaneous coronary intervention to the proximal to mid left anterior descending coronary artery  Moderate conscious sedation    INDICATIONS:  The patient is a 70-year-old gentleman referred for staged percutaneous coronary intervention to the left anterior descending coronary following recent percutaneous coronary intervention to the left circumflex coronary artery in the setting of STEMI.    CONSENT:  The complete alternatives, risks, and benefits of the procedure were explained to the patient. Signed informed consent was obtained and placed in the chart prior to the procedure.  A timeout was performed prior to beginning the procedure.    MEDICATIONS:  Lidocaine  Fentanyl  Midazolam  Nitroglycerin  Verapamil  Heparin    MODERATE CONSCIOUS SEDATION:  I personally supervised the administration of moderate conscious sedation by the nursing staff for 81 minutes.  Sedation start time: 12:56 PM  Sedation end time: 2:17 PM    CONTRAST: Omnipaque 72 cc    ACCESS: 6-[place]er Glidesheath in the right radial artery.    ESTIMATED BLOOD LOSS: 20 cc    COMPLICATIONS: None    PROCEDURE IN DETAIL:  The patient was brought to the cardiac catheterization laboratory in the fasting state.  The skin over the right wrist was prepped and draped in the usual sterile fashion. Lidocaine infiltration was used to anesthetize the tissue over the right radial artery.  Using the micropuncture technique, a 6-[place] Glidesheath was inserted in the right radial artery.  A 6-[place]er EBU-3.5 guide catheter was used to engage the ostium of the left main coronary artery and planning cineangiograms were obtained.  A Runthrough wire was advanced into the first diagonal branch for protection and  a Prowater wire was advanced into the distal aspect of the left anterior descending coronary.  IVUS was performed using an Apache eye IVUS catheter, which demonstrated a relatively healthy landing zone of the distal vessel with a true vessel diameter of approximately 3.1 mm.  There were several sequential areas of mixed fibrotic and calcific plaque throughout the mid to proximal vessel without discrete areas of concentric calcification.  There was extensive plaque in the proximal LAD all the way to the ostium without a good landing zone with a luminal diameter of approximately 3.5 mm.    Following IVUS, cutting balloon angioplasty was performed using a 3.0 x 15 mm [employer] balloon due to the degree of fibrotic plaque.  Further post dilation was then performed using a 3.0 x 20 mm noncompliant balloon.  A 3.0 x 38 mm Synergy drug-eluting stent could not be advanced through the tortuous segment in the mid LAD despite further predilation with a 3.5 x 15 mm noncompliant balloon.  A 2.5 x 24 mm Synergy drug-eluting stent was able to be advanced through this segment and was deployed in the mid vessel at a maximum pressure of 16 tarsha.  The 3.0 x 38 mm stent was then advanced into appropriate position overlapping the previously placed stent was deployed at a maximum pressure of 16 tarsha.  The IVUS catheter could not cross the ostium of the stents due to tortuosity.  A 3.0 x 12 mm noncompliant balloon was then used to postdilate the mid to proximal aspect of the stents at a maximum pressure of 20 tarsha, but could not be advanced through the tortuosity into the distal segment.  A 3.0 x 6 mm noncompliant balloon was able to pass through the tortuosity and was used to post dilate the distal aspect of the stent at a maximum pressure of 16 tarsha.  A 3.5 x 12 mm noncompliant balloon was then used to further postdilated the proximal aspect of the stents at a maximum pressure of 20 tarsha proximally.  Final cineangiograms were then obtained in  orthogonal views, which demonstrated excellent stent expansion and apposition with no evidence of wire perforation, thrombus or dissection.    Following completion of PCI, a 6-Chinese pigtail catheter was advanced over a J-wire into the left ventricular cavity and left ventricular pressures were measured per post procedure fluid protocol.  At the completion of the case, all wires, catheters, and sheaths were removed. A TR band was placed using the patent hemostasis technique.    HEMODYNAMICS:   Aortic pressure: 86/53 mmHg  Pre A-wave pressure: 10 mmHg  No significant aortic gradient on pullback    LIMITED CORONARY ANGIOGRAPHY:  Limited left system coronary angiography redemonstrated extensive, severe, calcific disease throughout the mid aspect of the LAD.  The recently placed left circumflex coronary artery stents were widely patent.    INTRAVASCULAR ULTRASOUND:  See IVUS findings in procedure details above.    PERCUTANEOUS CORONARY INTERVENTION:  Lesion location: Proximal to mid left anterior descending coronary  Lesion type: C  Lesion length: 55 mm  Pre-intervention CIPRIANO flow: 3  Post-intervention CIPRIANO flow: 3  Pre-intervention stenosis: 80%  Post-intervention stenosis: 0%  Stent #1: 3.0 x 38 mm Synergy drug-eluting stent  Stent #2: 2.5 x 24 mm Synergy drug-eluting stent    IMPRESSION:  Successful cutting balloon angioplasty and percutaneous coronary invention to the proximal to mid left anterior descending coronary artery with overlapping 3.0 x 38 mm and 2.5 x 24 mm Synergy drug-eluting stents postdilated to 3.6 mm proximally.  Widely patent left circumflex coronary artery stents.  Normal left heart filling pressures.    RECOMMENDATIONS:  Return to floor with continued care per primary service.  TR band release per protocol.  Post procedure fluids for renal protection.  Continue dual antiplatelet therapy and optimal cardiovascular risk factor management.    NOTIFICATION:  The patient's family was called and  notified of the results of his cardiac catheterization.

## 2025-06-07 NOTE — PROGRESS NOTES
Cardiology Follow Up Progress Note    Date of Service  6/7/2025    Attending Physician  Bernadine Hernandez*    Chief Complaint     Inferoposterior STEMI    HPI  Hemant Martínez is a 70 y.o. male with no prior cardiac history, hyperlipidemia, left nephrectomy 2019  secondary to RCC, hx of   on Xarelto admitted 6/6/2025 with chest pressure found inferior STEMI on EKG.      Interim Events    No overnight cardiac events  Telemetry-SB  Underwent successful PCI of LCx 6/6/2025.  Patient reports persistent/residual chest pain post PCI  EKG post PCI unremarkable.  Tentatively plan for staged PCI of residual LAD this  morning.  N.p.o.      Review of Systems  Review of Systems   Respiratory:  Positive for chest tightness. Negative for apnea, cough, choking, shortness of breath, wheezing and stridor.    Cardiovascular:  Positive for chest pain. Negative for leg swelling.       Vital signs in last 24 hours  Temp:  [36 °C (96.8 °F)-36.8 °C (98.2 °F)] 36.5 °C (97.7 °F)  Pulse:  [51-78] 59  BP: ()/(54-84) 109/60  SpO2:  [92 %-97 %] 93 %    Physical Exam  Physical Exam  Cardiovascular:      Rate and Rhythm: Bradycardia present.      Pulses: Normal pulses.   Pulmonary:      Effort: Pulmonary effort is normal.   Skin:     General: Skin is warm.   Neurological:      Mental Status: He is alert. Mental status is at baseline.   Psychiatric:         Mood and Affect: Mood normal.       Lab Review  Lab Results   Component Value Date/Time    WBC 4.7 (L) 06/07/2025 04:15 AM    RBC 4.49 (L) 06/07/2025 04:15 AM    HEMOGLOBIN 14.2 06/07/2025 04:15 AM    HEMATOCRIT 40.6 (L) 06/07/2025 04:15 AM    MCV 90.4 06/07/2025 04:15 AM    MCH 31.6 06/07/2025 04:15 AM    MCHC 35.0 06/07/2025 04:15 AM    MPV 10.6 06/07/2025 04:15 AM      Lab Results   Component Value Date/Time    SODIUM 137 06/07/2025 04:15 AM    POTASSIUM 4.3 06/07/2025 04:15 AM    CHLORIDE 107 06/07/2025 04:15 AM    CO2 20 06/07/2025 04:15 AM    GLUCOSE 122 (H) 06/07/2025 04:15  AM    BUN 18 06/07/2025 04:15 AM    CREATININE 1.40 06/07/2025 04:15 AM      Lab Results   Component Value Date/Time    ASTSGOT 142 (H) 06/07/2025 04:15 AM    ALTSGPT 28 06/07/2025 04:15 AM     Lab Results   Component Value Date/Time    CHOLSTRLTOT 121 06/06/2025 03:40 AM    LDL 74 06/06/2025 03:40 AM    HDL 37 (A) 06/06/2025 03:40 AM    TRIGLYCERIDE 49 06/06/2025 03:40 AM    TROPONINT 4974 (H) 06/06/2025 02:00 PM       Recent Labs     06/06/25  0141   NTPROBNP 100       Cardiac Imaging and Procedures Review  EKG:  inferoposterior infarct    Echocardiogram:  6/6/25  No prior study is available for comparison.   The left ventricular ejection fraction is visually estimated to be 45-  50%.  Global hypokinesis with regional variation.  Moderate mitral regurgitation.  Moderate tricuspid regurgitation.  Estimated right ventricular systolic pressure is 67 mmHg.    Cardiac Catheterization:    IMPRESSION:  1.  Occluded codominant left circumflex status post successful IVUS guided PCI deploying overlapping Synergy 4 x 12 mm, 3 x 28 mm and 2.5 x 12 mm Evelin.  Heavy plaque burden noted on imaging  2.  Residual severe mid LAD disease, CIPRIANO-3 flow  3.  Reduced estimated LVEF 40% with elevated resting LVEDP 25 mmHg no significant transaortic gradient on pullbac    Imaging  Chest X-Ray:     No evidence of acute cardiopulmonary process.     Assessment/Plan    1-Inferoposterior STEMI, LCx culprit.  2-solitary kidney  3-SAMUEL  4-Hyperlipidemia  5-LVEF 45 to 50%    -Underwent successful PCI/TERRI x2 overlapping LCx.  - Patient has severe residual mid LAD disease.  -Tentatively plan for staged PCI mid LAD 6/7/2025 pending renal function in am.  - Continue aspirin 81, Plavix 75.  - Continue atorvastatin 40 daily.  - BB on hold in the setting of bradycardia HR low 50s.  - Xarelto on hold pending staged PCI.  - Discussed outpatient sleep study in the setting of pulmonary hypertension seen on echo RVSP 67 mmHg.    Cardiology will follow  along    My total time spent caring for the patient on the day of the encounter was 15 minutes.   This does not include time spent on separately billable procedures/tests.     Thank you for allowing me to participate in the care of this patient.  I will continue to follow this patient    Please contact me with any questions.    JONH Mendoza.   Cardiologist, [facility]  [phone]

## 2025-06-07 NOTE — ASSESSMENT & PLAN NOTE
It appears her baseline is around 1.5, slightly acutely worse initially, likely due to hypoperfusion from STEMI   Did improve today even after contrast   Patient will receive more contrast today during a repeat cath   Closely monitor urine output   Avoid nephrotoxins   Repeat BMP in the morning

## 2025-06-07 NOTE — ASSESSMENT & PLAN NOTE
Now with an acute STEMI  Did have 100% occluded left circumflex and 80% occluded LAD  Cath Lab yesterday on today  Medical management with aspirin, Plavix and atorvastatin  Unable to give goal-directed medical therapy due to bradycardia and borderline hypotension

## 2025-06-07 NOTE — ASSESSMENT & PLAN NOTE
Stent placed in left circumflex due to 100% occlusion  For cardiac cath again today, will likely require additional stenting

## 2025-06-07 NOTE — PROGRESS NOTES
"Critical care medicine Progress Note    Date of admission  6/6/2025    Chief Complaint  70M admitted 6/6/2025 with \"hx of DVT/PE on xarelto, solitary kidney s/p left nephrectomy due kidney CA (no chemo) in 2020, hyperlipidemia who presented to ED after had chest pain. ECG c/w inferior/posterior/lateral infarct. STEMI alert was activate and pt taken to [facility]. ASA given, heparin gtt started. LHC showed 100% occluded Lcx s/p PCI TERRI with CIPRIANO-2 flow. 80% LAD. Mild RCA. LVEF 40%, LVEDP 25. Upon ICU arrival, hemodynamically stable. NSR. Pt denies chest pain\"    Hospital Course  6/5 -STEMI s/p successful 3 TERRI(s) to LCx, was bradycardic responsive to atropine  6/6 - persistent chest pain not responsive to nitro. Cr appears at baseline. No significant ectopy      Interval Problem Update  Reviewed last 24 hour events:  Neuro: No acute issues  Cards: NSR 53-78 , BP: /654-75, LVEF 45-50% with global hypokinesis with regional variation, moderate MR, moderate TR, RVSP 67. DAPT, statin, hold on initiating BB in setting of bradycardia, hold ARB   Pulm: on 2L NC, chest x-ray without clear infiltrate or pleural effusions  GI: NPO for cath  Renal: Cr: 1.40 (prior baseline 1.53 in 1/22/2025), UOP: 2500 (-1.95)  ID/Abx: WBC 4.7, H/H: 14.2/40.6, plts: 79  High eos (chronic)  Endo: BS at goal  Heme: Chronic thrombocytopenia, 89K, otherwise H&H stable  DVT ppx: hold Xarelto  GI ppx: Cardiac diet  Labs/imaging: Reviewed  Lines/tubes/Drains: None    Patient doing well this a.m.   Clear liquid diet  Possible [facility] later today  Hold rivaroxaban until after cath  Continue DAPT therapy and statin    Stable for transfer to med/tele  "

## 2025-06-07 NOTE — CARE PLAN
The patient is Watcher - Medium risk of patient condition declining or worsening    Shift Goals  Clinical Goals: pain control, vitals  Patient Goals: rest and comfort  Family Goals: audra    Progress made toward(s) clinical / shift goals:        Problem: Knowledge Deficit - Standard  Goal: Patient and family/care givers will demonstrate understanding of plan of care, disease process/condition, diagnostic tests and medications  Outcome: Progressing     Problem: Pain - Standard  Goal: Alleviation of pain or a reduction in pain to the patient’s comfort goal  Outcome: Progressing     Problem: Fall Risk  Goal: Patient will remain free from falls  Outcome: Progressing     Problem: Skin Integrity  Goal: Risk for impaired skin integrity will decrease  Outcome: Progressing     Problem: Skin Integrity  Goal: Skin integrity is maintained or improved  Outcome: Progressing

## 2025-06-07 NOTE — CONSULTS
Hospital Medicine Consultation    Date of Service  6/7/2025    Referring Physician  Bernadine Hernandez*    Consulting Physician  Rivera Morrow D.O.    Reason for Consultation  Overall medical management    History of Presenting Illness  70 y.o. male who presented 6/6/2025 with chest pain.  EKG did show ST elevation myocardial infarction, patient was emergently taken for left heart catheterization.  Patient was given aspirin and heparin drip was started.  Patient was noted to have 100% occluded left circumflex.  He did receive stenting with improvement.  Patient was also noted to have LAD with a long segment of 80% stenosis throughout the mid LAD right after the takeoff of a large diagonal branch.  Patient is going back to Cath Lab today.  Bradycardia that was noted post initial cath did require atropine, now appears resolved.  I did discuss the case including labs and procedure with the intensivist.    Review of Systems  Review of Systems   Constitutional:  Negative for chills, fever and malaise/fatigue.   HENT:  Negative for congestion.    Respiratory:  Negative for cough, sputum production, shortness of breath and stridor.    Cardiovascular:  Positive for chest pain. Negative for palpitations and leg swelling.   Gastrointestinal:  Negative for abdominal pain, constipation, diarrhea, nausea and vomiting.   Genitourinary:  Negative for dysuria and urgency.   Musculoskeletal:  Negative for falls and myalgias.   Neurological:  Negative for dizziness, tingling, loss of consciousness, weakness and headaches.   Psychiatric/Behavioral:  Negative for depression and suicidal ideas.    All other systems reviewed and are negative.      Past Medical History   has a past medical history of Presence of drug coated stent in left circumflex coronary artery - culprite STEMI 6/2025 (06/06/2025) and ST elevation myocardial infarction involving left circumflex coronary artery (HCC) (06/06/2025).    Surgical History   has no  past surgical history on file.    Family History  family history is not on file.    Social History   reports that he has never smoked. He has never used smokeless tobacco.    Medications  Prior to Admission Medications   Prescriptions Last Dose Informant Patient Reported? Taking?   allopurinol (ZYLOPRIM) 300 MG Tab 6/5/2025 Morning Patient Yes Yes   Sig: Take 300 mg by mouth every morning.   lovastatin (MEVACOR) 40 MG tablet 6/5/2025 Morning  Yes No   Sig: Take 40 mg by mouth every morning.   rivaroxaban (XARELTO) 10 MG Tab tablet 6/5/2025  Yes Yes   Sig: Take 10 mg by mouth every morning.   sildenafil citrate (VIAGRA) 25 MG Tab Unknown  Yes No   Sig: Take 25 mg by mouth 1 time a day as needed for Erectile Dysfunction.      Facility-Administered Medications: None       Allergies  Allergies[1]    Physical Exam  Temp:  [36 °C (96.8 °F)-36.8 °C (98.2 °F)] 36.5 °C (97.7 °F)  Pulse:  [51-78] 59  BP: ()/(54-76) 109/60  SpO2:  [92 %-97 %] 93 %    Physical Exam  Vitals and nursing note reviewed.   Constitutional:       General: He is not in acute distress.     Appearance: He is well-developed. He is not toxic-appearing or diaphoretic.   HENT:      Head: Normocephalic and atraumatic.      Right Ear: External ear normal.      Left Ear: External ear normal.      Nose: Nose normal. No congestion or rhinorrhea.      Mouth/Throat:      Pharynx: No oropharyngeal exudate.   Eyes:      General:         Right eye: No discharge.         Left eye: No discharge.   Neck:      Trachea: No tracheal deviation.   Cardiovascular:      Rate and Rhythm: Normal rate and regular rhythm.      Heart sounds: No murmur heard.     No friction rub. No gallop.   Pulmonary:      Effort: Pulmonary effort is normal. No respiratory distress.      Breath sounds: Normal breath sounds. No stridor. No wheezing or rales.   Chest:      Chest wall: No tenderness.   Abdominal:      General: Bowel sounds are normal. There is no distension.      Palpations:  Abdomen is soft.      Tenderness: There is no abdominal tenderness.   Musculoskeletal:         General: No tenderness. Normal range of motion.      Cervical back: Normal range of motion and neck supple. No edema or erythema.      Right lower leg: No edema.      Left lower leg: No edema.   Lymphadenopathy:      Cervical: No cervical adenopathy.   Skin:     General: Skin is warm and dry.      Findings: No erythema or rash.   Neurological:      Mental Status: He is alert and oriented to person, place, and time.      Cranial Nerves: No cranial nerve deficit.   Psychiatric:         Mood and Affect: Mood normal.         Behavior: Behavior normal.         Thought Content: Thought content normal.         Judgment: Judgment normal.         Fluids  Date 06/07/25 0700 - 06/08/25 0659   Shift 5762-8538 4577-9919 2169-5274 24 Hour Total   INTAKE   P.O. 0   0   I.V. 19.6   19.6   Shift Total 19.6   19.6   OUTPUT   Shift Total       Weight (kg) 89.6 89.6 89.6 89.6       Laboratory  Recent Labs     06/06/25  0141 06/07/25  0415   WBC 4.2* 4.7*   RBC 4.79 4.49*   HEMOGLOBIN 14.9 14.2   HEMATOCRIT 43.9 40.6*   MCV 91.6 90.4   MCH 31.1 31.6   MCHC 33.9 35.0   RDW 45.4 45.7   PLATELETCT 89* 79*   MPV 10.1 10.6     Recent Labs     06/06/25  0141 06/07/25  0415   SODIUM 139 137   POTASSIUM 4.1 4.3   CHLORIDE 107 107   CO2 20 20   GLUCOSE 113* 122*   BUN 21 18   CREATININE 1.54* 1.40   CALCIUM 9.3 8.5              Recent Labs     06/06/25  0340   TRIGLYCERIDE 49   HDL 37*   LDL 74        Imaging  EC-ECHOCARDIOGRAM COMPLETE W/ CONT   Final Result      DX-CHEST-LIMITED (1 VIEW)   Final Result      No evidence of acute cardiopulmonary process.      CL-LEFT HEART CATHETERIZATION WITH POSSIBLE INTERVENTION    (Results Pending)   CL-LEFT HEART CATHETERIZATION WITH POSSIBLE INTERVENTION    (Results Pending)       Assessment/Plan  * ST elevation myocardial infarction involving left circumflex coronary artery (HCC)- (present on  admission)  Assessment & Plan  Culprit lesion noted in left circumflex, 100% occluded, stented on 6/6  Patient also noted to have 80% disease in the mid LAD  Going for Cath Lab again today for additional stenting   Continue dual antiplatelet therapy with aspirin and Plavix  Unable to initiate goal-directed medical therapy at this time due to low blood pressure and heart rate    Presence of drug coated stent in left circumflex coronary artery - culprite STEMI 6/2025  Assessment & Plan  Stent placed in left circumflex due to 100% occlusion  For cardiac cath again today, will likely require additional stenting    Coronary artery disease due to lipid rich plaque - STEMI 6/2025- (present on admission)  Assessment & Plan  Now with an acute STEMI  Did have 100% occluded left circumflex and 80% occluded LAD  Cath Lab yesterday on today  Medical management with aspirin, Plavix and atorvastatin  Unable to give goal-directed medical therapy due to bradycardia and borderline hypotension    Hx of pulmonary embolus- (present on admission)  Assessment & Plan  Holding home Xarelto due to dual antiplatelet therapy  Cardiology recommended holding for a year    History of renal cell cancer- (present on admission)  Assessment & Plan  Outpatient follow up    CKD (chronic kidney disease)- (present on admission)  Assessment & Plan  It appears her baseline is around 1.5, slightly acutely worse initially, likely due to hypoperfusion from STEMI   Did improve today even after contrast   Patient will receive more contrast today during a repeat cath   Closely monitor urine output   Avoid nephrotoxins   Repeat BMP in the morning     Dyslipidemia- (present on admission)  Assessment & Plan  Continue statin    Gout, unspecified- (present on admission)  Assessment & Plan  Continue home allopurinol     Thrombocytopenia (HCC)- (present on admission)  Assessment & Plan  Stable with no sign of gross bleeding  Repeat CBC in the morning    Sarcoidosis-  (present on admission)  Assessment & Plan  Outpatient follow-up      Time spent on consultation including medication, laboratory and chart review, discussion with intensivist, cardiologist, pharmacist and bedside RN as well as physical exam took greater than 80 minutes         [1]   Allergies  Allergen Reactions    Penicillin G Unspecified     Unknown reaction as a child

## 2025-06-07 NOTE — PROGRESS NOTES
Monitor Summary  SB-SR 50-70s  Occational PVCs and runs of idioventricular beats  0.20/0.09/0.40

## 2025-06-08 ENCOUNTER — PHARMACY VISIT (OUTPATIENT)
Dept: PHARMACY | Facility: MEDICAL CENTER | Age: 70
End: 2025-06-08
Payer: COMMERCIAL

## 2025-06-08 VITALS
RESPIRATION RATE: 17 BRPM | OXYGEN SATURATION: 94 % | BODY MASS INDEX: 26.99 KG/M2 | HEART RATE: 73 BPM | TEMPERATURE: 97.1 F | HEIGHT: 72 IN | SYSTOLIC BLOOD PRESSURE: 111 MMHG | WEIGHT: 199.3 LBS | DIASTOLIC BLOOD PRESSURE: 69 MMHG

## 2025-06-08 LAB
ALBUMIN SERPL BCP-MCNC: 3.2 G/DL (ref 3.2–4.9)
ALBUMIN/GLOB SERPL: 1.5 G/DL
ALP SERPL-CCNC: 74 U/L (ref 30–99)
ALT SERPL-CCNC: 25 U/L (ref 2–50)
ANION GAP SERPL CALC-SCNC: 11 MMOL/L (ref 7–16)
AST SERPL-CCNC: 94 U/L (ref 12–45)
BILIRUB SERPL-MCNC: 0.6 MG/DL (ref 0.1–1.5)
BUN SERPL-MCNC: 18 MG/DL (ref 8–22)
CALCIUM ALBUM COR SERPL-MCNC: 8.7 MG/DL (ref 8.5–10.5)
CALCIUM SERPL-MCNC: 8.1 MG/DL (ref 8.5–10.5)
CHLORIDE SERPL-SCNC: 109 MMOL/L (ref 96–112)
CO2 SERPL-SCNC: 18 MMOL/L (ref 20–33)
CREAT SERPL-MCNC: 1.43 MG/DL (ref 0.5–1.4)
EKG IMPRESSION: NORMAL
ERYTHROCYTE [DISTWIDTH] IN BLOOD BY AUTOMATED COUNT: 46.3 FL (ref 35.9–50)
GFR SERPLBLD CREATININE-BSD FMLA CKD-EPI: 52 ML/MIN/1.73 M 2
GLOBULIN SER CALC-MCNC: 2.1 G/DL (ref 1.9–3.5)
GLUCOSE SERPL-MCNC: 107 MG/DL (ref 65–99)
HCT VFR BLD AUTO: 40.2 % (ref 42–52)
HGB BLD-MCNC: 13.8 G/DL (ref 14–18)
MAGNESIUM SERPL-MCNC: 2 MG/DL (ref 1.5–2.5)
MCH RBC QN AUTO: 31.4 PG (ref 27–33)
MCHC RBC AUTO-ENTMCNC: 34.3 G/DL (ref 32.3–36.5)
MCV RBC AUTO: 91.4 FL (ref 81.4–97.8)
PLATELET # BLD AUTO: 76 K/UL (ref 164–446)
PLATELETS.RETICULATED NFR BLD AUTO: 2.3 % (ref 0.6–13.1)
PMV BLD AUTO: 10.8 FL (ref 9–12.9)
POTASSIUM SERPL-SCNC: 4.3 MMOL/L (ref 3.6–5.5)
PROT SERPL-MCNC: 5.3 G/DL (ref 6–8.2)
RBC # BLD AUTO: 4.4 M/UL (ref 4.7–6.1)
SODIUM SERPL-SCNC: 138 MMOL/L (ref 135–145)
WBC # BLD AUTO: 4 K/UL (ref 4.8–10.8)

## 2025-06-08 PROCEDURE — 99232 SBSQ HOSP IP/OBS MODERATE 35: CPT | Performed by: NURSE PRACTITIONER

## 2025-06-08 PROCEDURE — RXMED WILLOW AMBULATORY MEDICATION CHARGE: Performed by: NURSE PRACTITIONER

## 2025-06-08 PROCEDURE — 99239 HOSP IP/OBS DSCHRG MGMT >30: CPT | Performed by: INTERNAL MEDICINE

## 2025-06-08 PROCEDURE — 80053 COMPREHEN METABOLIC PANEL: CPT

## 2025-06-08 PROCEDURE — 700102 HCHG RX REV CODE 250 W/ 637 OVERRIDE(OP): Performed by: INTERNAL MEDICINE

## 2025-06-08 PROCEDURE — 85027 COMPLETE CBC AUTOMATED: CPT

## 2025-06-08 PROCEDURE — 700102 HCHG RX REV CODE 250 W/ 637 OVERRIDE(OP)

## 2025-06-08 PROCEDURE — 85055 RETICULATED PLATELET ASSAY: CPT

## 2025-06-08 PROCEDURE — A9270 NON-COVERED ITEM OR SERVICE: HCPCS

## 2025-06-08 PROCEDURE — 93010 ELECTROCARDIOGRAM REPORT: CPT | Performed by: INTERNAL MEDICINE

## 2025-06-08 PROCEDURE — A9270 NON-COVERED ITEM OR SERVICE: HCPCS | Performed by: INTERNAL MEDICINE

## 2025-06-08 PROCEDURE — 36415 COLL VENOUS BLD VENIPUNCTURE: CPT

## 2025-06-08 PROCEDURE — RXMED WILLOW AMBULATORY MEDICATION CHARGE: Performed by: INTERNAL MEDICINE

## 2025-06-08 PROCEDURE — 99232 SBSQ HOSP IP/OBS MODERATE 35: CPT | Mod: FS | Performed by: INTERNAL MEDICINE

## 2025-06-08 PROCEDURE — 700102 HCHG RX REV CODE 250 W/ 637 OVERRIDE(OP): Performed by: NURSE PRACTITIONER

## 2025-06-08 PROCEDURE — A9270 NON-COVERED ITEM OR SERVICE: HCPCS | Performed by: NURSE PRACTITIONER

## 2025-06-08 PROCEDURE — 83735 ASSAY OF MAGNESIUM: CPT

## 2025-06-08 RX ORDER — METOPROLOL SUCCINATE 25 MG/1
12.5 TABLET, EXTENDED RELEASE ORAL
Status: DISCONTINUED | OUTPATIENT
Start: 2025-06-08 | End: 2025-06-08

## 2025-06-08 RX ORDER — LOSARTAN POTASSIUM 25 MG/1
12.5 TABLET ORAL EVERY EVENING
Qty: 30 TABLET | Refills: 1 | Status: SHIPPED | OUTPATIENT
Start: 2025-06-08 | End: 2025-06-23 | Stop reason: SDUPTHER

## 2025-06-08 RX ORDER — ASPIRIN 81 MG/1
81 TABLET ORAL DAILY
Qty: 7 TABLET | Refills: 0 | Status: SHIPPED | OUTPATIENT
Start: 2025-06-09 | End: 2025-06-23

## 2025-06-08 RX ORDER — LOSARTAN POTASSIUM 25 MG/1
12.5 TABLET ORAL EVERY EVENING
Status: DISCONTINUED | OUTPATIENT
Start: 2025-06-08 | End: 2025-06-08 | Stop reason: HOSPADM

## 2025-06-08 RX ORDER — METOPROLOL SUCCINATE 25 MG/1
25 TABLET, EXTENDED RELEASE ORAL
Status: DISCONTINUED | OUTPATIENT
Start: 2025-06-08 | End: 2025-06-08 | Stop reason: HOSPADM

## 2025-06-08 RX ORDER — ATORVASTATIN CALCIUM 40 MG/1
40 TABLET, FILM COATED ORAL EVERY EVENING
Qty: 30 TABLET | Refills: 1 | Status: SHIPPED | OUTPATIENT
Start: 2025-06-08 | End: 2025-06-23 | Stop reason: SDUPTHER

## 2025-06-08 RX ORDER — CLOPIDOGREL BISULFATE 75 MG/1
75 TABLET ORAL DAILY
Qty: 90 TABLET | Refills: 0 | Status: SHIPPED | OUTPATIENT
Start: 2025-06-09 | End: 2025-06-23 | Stop reason: SDUPTHER

## 2025-06-08 RX ORDER — METOPROLOL SUCCINATE 25 MG/1
25 TABLET, EXTENDED RELEASE ORAL DAILY
Qty: 30 TABLET | Refills: 1 | Status: SHIPPED | OUTPATIENT
Start: 2025-06-08 | End: 2025-06-23 | Stop reason: SDUPTHER

## 2025-06-08 RX ADMIN — ASPIRIN 81 MG: 81 TABLET, COATED ORAL at 04:25

## 2025-06-08 RX ADMIN — CLOPIDOGREL BISULFATE 75 MG: 75 TABLET, FILM COATED ORAL at 04:25

## 2025-06-08 RX ADMIN — METOPROLOL SUCCINATE 25 MG: 25 TABLET, EXTENDED RELEASE ORAL at 09:04

## 2025-06-08 RX ADMIN — ALLOPURINOL 300 MG: 300 TABLET ORAL at 04:25

## 2025-06-08 ASSESSMENT — COGNITIVE AND FUNCTIONAL STATUS - GENERAL
SUGGESTED CMS G CODE MODIFIER MOBILITY: CH
DAILY ACTIVITIY SCORE: 24
SUGGESTED CMS G CODE MODIFIER DAILY ACTIVITY: CH
MOBILITY SCORE: 24

## 2025-06-08 ASSESSMENT — FIBROSIS 4 INDEX: FIB4 SCORE: 17.81

## 2025-06-08 ASSESSMENT — PAIN DESCRIPTION - PAIN TYPE: TYPE: ACUTE PAIN

## 2025-06-08 ASSESSMENT — ENCOUNTER SYMPTOMS
CHOKING: 0
APNEA: 0
WHEEZING: 0
COUGH: 0
CHEST TIGHTNESS: 0
SHORTNESS OF BREATH: 0
STRIDOR: 0

## 2025-06-08 NOTE — PROGRESS NOTES
Cardiology Follow Up Progress Note    Date of Service  6/8/2025    Attending Physician  Bernadine Hernandez*    Chief Complaint     Inferoposterior STEMI    HPI  Hemant Martínez is a 70 y.o. male with no prior cardiac history, hyperlipidemia, left nephrectomy 2019  secondary to RCC, hx of   on Xarelto admitted 6/6/2025 with chest pressure found inferior STEMI on EKG.      Interim Events    No overnight cardiac events  Telemetry-SB  Underwent successful PCI of LCx 6/6/2025  Underwent cutting balloon angioplasty/PCI to the proximal to mid LAD with overlapping 3.0 x 38 mm and 2.5 x 24 mm Synergy drug-eluting stents postdilated to 3.6 mm proximally 6/7/2025.  Labs reviewed  SCr 1.43  SBP appropriate  Review of Systems  Review of Systems   Respiratory:  Negative for apnea, cough, choking, chest tightness, shortness of breath, wheezing and stridor.    Cardiovascular:  Negative for chest pain and leg swelling.       Vital signs in last 24 hours  Temp:  [36.2 °C (97.1 °F)-37.1 °C (98.7 °F)] 36.2 °C (97.1 °F)  Pulse:  [61-83] 83  Resp:  [16-19] 17  BP: (104-135)/(59-79) 115/76  SpO2:  [89 %-97 %] 94 %    Physical Exam  Physical Exam  Cardiovascular:      Pulses: Normal pulses.   Pulmonary:      Effort: Pulmonary effort is normal.   Skin:     General: Skin is warm.   Neurological:      Mental Status: He is alert. Mental status is at baseline.   Psychiatric:         Mood and Affect: Mood normal.         Lab Review  Lab Results   Component Value Date/Time    WBC 4.0 (L) 06/08/2025 02:47 AM    RBC 4.40 (L) 06/08/2025 02:47 AM    HEMOGLOBIN 13.8 (L) 06/08/2025 02:47 AM    HEMATOCRIT 40.2 (L) 06/08/2025 02:47 AM    MCV 91.4 06/08/2025 02:47 AM    MCH 31.4 06/08/2025 02:47 AM    MCHC 34.3 06/08/2025 02:47 AM    MPV 10.8 06/08/2025 02:47 AM      Lab Results   Component Value Date/Time    SODIUM 138 06/08/2025 02:47 AM    POTASSIUM 4.3 06/08/2025 02:47 AM    CHLORIDE 109 06/08/2025 02:47 AM    CO2 18 (L) 06/08/2025 02:47 AM     GLUCOSE 107 (H) 06/08/2025 02:47 AM    BUN 18 06/08/2025 02:47 AM    CREATININE 1.43 (H) 06/08/2025 02:47 AM      Lab Results   Component Value Date/Time    ASTSGOT 94 (H) 06/08/2025 02:47 AM    ALTSGPT 25 06/08/2025 02:47 AM     Lab Results   Component Value Date/Time    CHOLSTRLTOT 121 06/06/2025 03:40 AM    LDL 74 06/06/2025 03:40 AM    HDL 37 (A) 06/06/2025 03:40 AM    TRIGLYCERIDE 49 06/06/2025 03:40 AM    TROPONINT 4974 (H) 06/06/2025 02:00 PM       Recent Labs     06/06/25  0141   NTPROBNP 100       Cardiac Imaging and Procedures Review  EKG:  inferoposterior infarct    Echocardiogram:  6/6/25  No prior study is available for comparison.   The left ventricular ejection fraction is visually estimated to be 45-  50%.  Global hypokinesis with regional variation.  Moderate mitral regurgitation.  Moderate tricuspid regurgitation.  Estimated right ventricular systolic pressure is 67 mmHg.    Cardiac Catheterization:    IMPRESSION:  1.  Occluded codominant left circumflex status post successful IVUS guided PCI deploying overlapping Synergy 4 x 12 mm, 3 x 28 mm and 2.5 x 12 mm Evelin.  Heavy plaque burden noted on imaging  2.  Residual severe mid LAD disease, CIPRIANO-3 flow  3.  Reduced estimated LVEF 40% with elevated resting LVEDP 25 mmHg no significant transaortic gradient on pull back          Cardiac cath 6/7/25  IMPRESSION:  Successful cutting balloon angioplasty and percutaneous coronary invention to the proximal to mid left anterior descending coronary artery with overlapping 3.0 x 38 mm and 2.5 x 24 mm Synergy drug-eluting stents postdilated to 3.6 mm proximally.  Widely patent left circumflex coronary artery stents.  Normal left heart filling pressures.      Imaging  Chest X-Ray:     No evidence of acute cardiopulmonary process.     Assessment/Plan    1-Inferoposterior STEMI, LCx culprit.  2-solitary kidney  3-SAMUEL  4-Hyperlipidemia  5-LVEF 45 to 50%  6-patient has residual severe mid LAD disease.    - Successful  PCI/TERRI x2 overlapping LCx. 6/6/25.  - Successful staged LAD PCI 6/7/25.  - Continue aspirin 81, Plavix 75.  - Continue atorvastatin 40 daily.  - Initiate low-dose BB, on hold previously secondary to bradycardia but improved can start with 25 mg daily.  -GDMT for mild cardiomyopathy initiate low-dose losartan 12.5 mg daily.  - Resume Xarelto, history of recurrent DVT.  -Triple therapy for 1 week then discontinue aspirin and continue with Plavix and Xarelto.  - Discussed outpatient sleep study in the setting of pulmonary hypertension seen on echo RVSP 67 mmHg.      Repeat BMP in 1 week outpatient, will arrange.    No further cardiac recommendations.  Cardiology will sign off.  Close follow-up in cardiology will arrange.      Thank you for allowing me to participate in the care of this patient.      Please contact me with any questions.    JONH Mendoza.   Cardiologist, [facility]  [phone]

## 2025-06-08 NOTE — CARE PLAN
The patient is Stable - Low risk of patient condition declining or worsening    Shift Goals  Clinical Goals: monitor right radial site, monitor for CP, VSS  Patient Goals: rest and comfort  Family Goals: audra    Progress made toward(s) clinical / shift goals:        Problem: Knowledge Deficit - Standard  Goal: Patient and family/care givers will demonstrate understanding of plan of care, disease process/condition, diagnostic tests and medications  Description: Target End Date:  1-3 days or as soon as patient condition allowsDocument in Patient Education1.  Patient and family/caregiver oriented to unit, equipment, visitation policy and means for communicating concern2.  Complete/review Learning Assessment3.  Assess knowledge level of disease process/condition, treatment plan, diagnostic tests and medications4.  Explain disease process/condition, treatment plan, diagnostic tests and medications  Outcome: Progressing     Problem: Hemodynamics  Goal: Patient's hemodynamics, fluid balance and neurologic status will be stable or improve  Description: Target End Date:  Prior to discharge or change in level of careDocument on Assessment and I/O flowsheet templates1.  Monitor vital signs, pulse oximetry and cardiac monitor per provider order and/or policy2.  Maintain blood pressure per provider order3.  Hemodynamic monitoring per provider order4.  Manage IV fluids and IV infusions5.  Monitor intake and output6.  Daily weights per unit policy or provider order7.  Assess peripheral pulses and capillary refill8.  Assess color and body temperature9.  Position patient for maximum circulation/cardiac [id number]. Monitor for signs/symptoms of excessive [id number]. Assess mental status, restlessness and changes in level of [id number]. Monitor temperature and report fever or hypothermia to provider immediately. Consideration of targeted temperature management.  Outcome: Progressing     Problem: Pain - Standard  Goal: Alleviation of  pain or a reduction in pain to the patient’s comfort goal  Description: Target End Date:  Prior to discharge or change in level of careDocument on Vitals flowsheet1.  Document pain using the appropriate pain scale per order or unit policy2.  Educate and implement non-pharmacologic comfort measures (i.e. relaxation, distraction, massage, cold/heat therapy, etc.)3.  Pain management medications as ordered4.  Reassess pain after pain med administration per policy5.  If opiods administered assess patient's response to pain medication is appropriate per POSS sedation scale6.  Follow pain management plan developed in collaboration with patient and interdisciplinary team (including palliative care or pain specialists if applicable)  Outcome: Progressing

## 2025-06-08 NOTE — PROGRESS NOTES
4 Eyes Skin Assessment Completed by NEIL Santos and NEIL Moore.    Skin assessment is primarily focused on high risk bony prominences. Pay special attention to skin beneath and around medical devices, high risk bony prominences, skin to skin areas and areas where the patient lacks sensation to feel pain and areas where the patient previously had breakdown.     Head (Occipital):  WDL   Ears (Under Medical Devices): WDL   Nose (Under Medical Devices): WDL   Mouth:  WDL   Neck: WDL   Breast/Chest:  WDL   Shoulder Blades:  WDL   Spine:   WDL   (R) Arm/Elbow/Hand: Pink, Blanching, and right TR band   (L) Arm/Elbow/Hand: Pink and Blanching   Abdomen: WDL   Pannus/Groin:  WDL   Sacrum/Coccyx:   Pink and Blanching   (R) Ischial Tuberosity (Sit Bones):  WDL   (L) Ischial Tuberosity (Sit Bones):  WDL   (R) Leg:  WDL   (L) Leg:  WDL   (R) Heel:  WDL   (R) Foot/Toe: WDL   (L) Heel: WDL   (L) Foot/Toe:  WDL       DEVICES IN USE:   Respiratory Devices:  NA, patient on room air  Feeding Devices:  N/A   Lines & BP Monitoring Devices:  Peripheral IV, BP cuff, and Pulse ox    Orthopedic Devices:  N/A  Miscellaneous Devices:  Telemetry monitor    PROTOCOL INTERVENTIONS:   Standard/Trauma Bed:  Already in place    WOUND PHOTOS:   N/A no wounds identified    WOUND CONSULT:   N/A, no advanced wound care needs identified

## 2025-06-08 NOTE — DISCHARGE INSTRUCTIONS
Radial Catherization Discharge Instructions      Do not subject hand/arm to any forceful movements for 24 hours    i.e. supporting weight when rising from the chair or bed.   Do not drive a car for 24 hours  You may remove the dressing tomorrow  You may shower on the day following your procedure.  Do not take a tub bath or submerge the puncture site in water for 3 days following the procedure.  No Lifting more than 3-5 pounds with affected wrist for 5 days  Follow up with cardiology  2-4 weeks.  Increase fluids for 2 days post procedure.  Continue all previous medications unless otherwise instructed.    If bleeding should occur following discharge:  Sit down and apply firm pressure to site with your fingers for 10 minutes  If the bleeding stops, continue to sit quietly, keeping your wrist straight for 2 hours.  Notify physician as soon as possible ( [phone])  If bleeding does not stop after 10 minutes, or if there is a large amount of bleeding or spurting, call 911 immediately.  Do not drive yourself to the hospital.    Diagnosis:  Acute Coronary Syndrome (ACS) is a diagnosis that encompasses cardiac-related chest pain and heart attack. ACS occurs when the blood flow to the heart muscle is severely reduced or cut off completely due to a slow process called atherosclerosis.  Atherosclerosis is a disease in which the coronary arteries become narrow from a buildup of fat, cholesterol, and other substances that combine to form plaque. If the plaque breaks, a blood clot will form and block the blood flow to the heart muscle. This lack of blood flow can cause damage or death to the heart muscle which is called a heart attack or Myocardial Infarction (MI). There are two different types of MIs:  ST Elevation Myocardial Infarction or STEMI (the most severe type of heart attack) and Non-ST Elevation Myocardial Infarction or NSTEMI.    Treatment Plan:  Cardiac Diet  - Low fat, low salt, low cholesterol   Cardiac Rehab  -  Your doctor has ordered you a referral to [facility].  Call [phone] to schedule an appointment.  Attend my follow-up appointment with my Cardiologist.  Take my medications as prescribed by my doctor  Exercise daily  Lower my bad cholesterol and raise my good cholesterol and Reduce stress    Medications:  Certain medications are used to treat ACS.  Remember to always take medications as prescribed and never stop talking medications unless told by your doctor.    You have been prescribed the following medicatons:    Aspirin - Aspirin is used as a blood thinning medication and you will require this medication indefinitely.  Anti-platelet/blood thinner - Your Anti-platelet/Blood thinning medication is called Rivaroxiban/Xarelto, and is used in combination with aspirin to prevent clots from forming in your heart and/or around your stent.  Your doctor will determine how long you need to be on this medicine.  Beta-Blocker - Beta-Blocker Metoprolol/Toprol XL is used to lower blood pressure and heart rate, and/or helps your heart heal after a heart attack.  Statin - Statin Atorvastatin/Lipitor is used to lower cholesterol.  Angiotensin Receptor Blocker (ARB) - Angiotensin Receptor Blocker Losartan/Cozaar is used to lower blood pressure and treat heart failure.    HF Patient Discharge Instructions  Monitor your weight daily, and maintain a weight chart, to track your weight changes.   Activity as tolerated, unless your Doctor has ordered otherwise. Other activity order.  Follow a low fat, low cholesterol, low salt diet unless instructed otherwise by your Doctor. Read the labels on the back of food products and track your intake of fat, cholesterol and salt.   Fluid Restriction No. If a Fluid Restriction has been ordered by your Doctor, measure fluids with a measuring cup to ensure that you are not exceeding the restriction.   No smoking.  Oxygen No. If your Doctor has ordered that you wear Oxygen at home, it is important  to wear it as ordered.  Did you receive an explanation from staff on the importance of taking each of your medications and why it is necessary to keep taking them unless your doctor says to stop? Yes  Were all of your questions answered about how to manage your heart failure and what to do if you have increased signs and symptoms after you go home? Yes  Do you feel like your heart failure care team involved you in the care treatment plan and allowed you to make decisions regarding your care while in the hospital and addressed any discharge needs you might have? Yes    See the educational handout provided at discharge for more information on monitoring your daily weight, activity and diet. This also explains more about Heart Failure, symptoms of a flare-up and some of the tests that you have undergone.     Warning Signs of a Flare-Up include:  Swelling in the ankles or lower legs.  Shortness of breath, while at rest, or while doing normal activities.   Shortness of breath at night when in bed, or coughing in bed.   Requiring more pillows to sleep at night, or needing to sit up at night to sleep.  Feeling weak, dizzy or fatigued.     When to call your Doctor:  Call your Primary Care Physician or Cardiologist if:   You experience any pain radiating to your jaw or neck.  You have any difficulty breathing.  You experience weight gain of 3 lbs in a day or 5 lbs in a week.   You feel any palpitations or irregular heartbeats.  You become dizzy or lose consciousness.   If you have had an angiogram or had a pacemaker or AICD placed, and experience:  Bleeding, drainage or swelling at the surgical / puncture site.  Fever greater than 100.0 F  Shock from internal defibrillator.  Cool and / or numb extremities.     Please access the [facility] My HF Guide/Heart Failure Interactive Workbook:   [email or web address]

## 2025-06-08 NOTE — DISCHARGE SUMMARY
Discharge Summary    CHIEF COMPLAINT ON ADMISSION  Chest pain    Reason for Admission  STEMI     Admission Date  6/6/2025    CODE STATUS  Full Code    HPI & HOSPITAL COURSE  70 y.o. 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  EKG did show ST elevation myocardial infarction, patient was emergently taken for left heart catheterization.  Patient was given aspirin and heparin drip was started.  Patient was noted to have 100% occluded left circumflex.  He underwent PCI with stent placement with improvement.  Patient was also noted to have LAD with a long segment of 80% stenosis throughout the mid LAD right after the takeoff of a large diagonal branch.  He underwent repeat cardiac catheterization with Cutting Balloon angioplasty/PCI to the proximal to mid LAD with overlapping 3.0 x 38 mm and 2.5 x 24 mm Synergy drug-eluting stents postdilated to 3.6 mm proximally 6/7/2025.  His symptoms improved.  His 2D echo revealed LVEF 45 to 50%.  He was started on low-dose beta-blocker.  Unable to start losartan and Aldactone due to his renal insufficiency.  He was started on aspirin and Plavix.  He was recommended triple therapy for 1 week with Xarelto and then discontinue aspirin after 1 week and continue Plavix and Xarelto after that.  Patient was instructed to follow-up with his PCP and to follow-up with cardiology    Therefore, he is discharged in good and stable condition to home with close outpatient follow-up.    The patient met 2-midnight criteria for an inpatient stay at the time of discharge.    Discharge Date  6/8/25    FOLLOW UP ITEMS POST DISCHARGE  PCP and Cardiology    DISCHARGE DIAGNOSES  Principal Problem:    ST elevation myocardial infarction involving left circumflex coronary artery (HCC) (Chronic) (POA: Yes)  Active Problems:    Sarcoidosis (POA: Yes)    Thrombocytopenia (HCC) (POA: Yes)    Gout, unspecified (POA:  Yes)    Dyslipidemia (Chronic) (POA: Yes)    CKD (chronic kidney disease) (POA: Yes)    History of renal cell cancer (POA: Yes)    Hx of pulmonary embolus (POA: Yes)    Coronary artery disease due to lipid rich plaque - STEMI 6/2025 (Chronic) (POA: Yes)    Presence of drug coated stent in left circumflex coronary artery - culprite STEMI 6/2025 (Chronic) (POA: No)  Resolved Problems:    * No resolved hospital problems. *      FOLLOW UP  No future appointments.  [facility]  [address]  [address]  [place] [id number] [phone]  Follow up  Your doctor has referred you for Cardiac Rehab which is important in your recovery. Please call to make an appointment or speak with your local doctor to find a program in your area.      MEDICATIONS ON DISCHARGE     Medication List        START taking these medications        Instructions   aspirin 81 MG EC tablet  Start taking on: June 9, 2025   Take 1 Tablet by mouth every day.  Dose: 81 mg     atorvastatin 40 MG Tabs  Commonly known as: Lipitor   Take 1 Tablet by mouth every evening.  Dose: 40 mg     clopidogrel 75 MG Tabs  Start taking on: June 9, 2025  Commonly known as: Plavix   Take 1 Tablet by mouth every day.  Dose: 75 mg     losartan 25 MG Tabs  Commonly known as: Cozaar   Take 0.5 Tablets by mouth every evening.  Dose: 12.5 mg     metoprolol SR 25 MG Tb24  Commonly known as: Toprol XL   Take 1 Tablet by mouth every day.  Dose: 25 mg            CHANGE how you take these medications        Instructions   rivaroxaban 20 MG Tabs tablet  What changed:   medication strength  how much to take  when to take this  Commonly known as: Xarelto   Take 1 Tablet by mouth with dinner.  Dose: 20 mg            CONTINUE taking these medications        Instructions   allopurinol 300 MG Tabs  Commonly known as: Zyloprim   Take 300 mg by mouth every morning.  Dose: 300 mg     sildenafil citrate 25 MG Tabs  Commonly known as: Viagra   Take 25 mg by mouth 1 time  a day as needed for Erectile Dysfunction.  Dose: 25 mg            STOP taking these medications      lovastatin 40 MG tablet  Commonly known as: Mevacor              Allergies  Allergies[1]    DIET  Orders Placed This Encounter   Procedures    Diet Order Diet: Cardiac     Standing Status:   Standing     Number of Occurrences:   1     Diet::   Cardiac [6]       ACTIVITY  As tolerated.  Weight bearing as tolerated    CONSULTATIONS  cardiology    PROCEDURES  Cardiac cath    LABORATORY  Lab Results   Component Value Date    SODIUM 138 06/08/2025    POTASSIUM 4.3 06/08/2025    CHLORIDE 109 06/08/2025    CO2 18 (L) 06/08/2025    GLUCOSE 107 (H) 06/08/2025    BUN 18 06/08/2025    CREATININE 1.43 (H) 06/08/2025        Lab Results   Component Value Date    WBC 4.0 (L) 06/08/2025    HEMOGLOBIN 13.8 (L) 06/08/2025    HEMATOCRIT 40.2 (L) 06/08/2025    PLATELETCT 76 (L) 06/08/2025        Total time of the discharge process exceeds 34 minutes.         [1]   Allergies  Allergen Reactions    Penicillin G Unspecified     Unknown reaction as a child

## 2025-06-08 NOTE — PROGRESS NOTES
Bedside report received from off going RN/tech: Danielle, assumed care of patient. Pt resting comfortably in bed. Right radial site soft, +1 pulse, Cap refill < 3 seconds, TR band with 3 mL air.     Fall Risk Score: LOW RISK  Fall risk interventions in place: Provide patient/family education based on risk assessment and Educate patient/family to call staff for assistance when getting out of bed  Bed type: Regular (Pop Score less than 17 interventions in place)  Patient on cardiac monitor: Yes  IVF/IV medications: Infusion per MAR (List Med(s)) NS @ 400  Oxygen: Room Air  Bedside sitter: Not Applicable   Isolation: Not applicable

## 2025-06-08 NOTE — PROGRESS NOTES
Bedside report received from off going RN/tech: Arabella, assumed care of patient.     Fall Risk Score: NO RISK  Fall risk interventions in place: Provide patient/family education based on risk assessment and Place fall precaution signage outside patient door  Bed type: Regular (Pop Score less than 17 interventions in place)  Patient on cardiac monitor: Yes  IVF/IV medications: Not Applicable   Oxygen: Room Air  Bedside sitter: Not Applicable   Isolation: Not applicable

## 2025-06-23 ENCOUNTER — OFFICE VISIT (OUTPATIENT)
Dept: CARDIOLOGY | Facility: MEDICAL CENTER | Age: 70
End: 2025-06-23
Attending: PHYSICIAN ASSISTANT
Payer: MEDICARE

## 2025-06-23 VITALS
HEIGHT: 72 IN | HEART RATE: 84 BPM | RESPIRATION RATE: 16 BRPM | DIASTOLIC BLOOD PRESSURE: 72 MMHG | OXYGEN SATURATION: 98 % | BODY MASS INDEX: 25.33 KG/M2 | WEIGHT: 187 LBS | SYSTOLIC BLOOD PRESSURE: 112 MMHG

## 2025-06-23 DIAGNOSIS — N18.31 STAGE 3A CHRONIC KIDNEY DISEASE: ICD-10-CM

## 2025-06-23 DIAGNOSIS — D69.6 THROMBOCYTOPENIA (HCC): ICD-10-CM

## 2025-06-23 DIAGNOSIS — I21.21 ST ELEVATION MYOCARDIAL INFARCTION INVOLVING LEFT CIRCUMFLEX CORONARY ARTERY (HCC): Primary | Chronic | ICD-10-CM

## 2025-06-23 DIAGNOSIS — E78.5 DYSLIPIDEMIA: Chronic | ICD-10-CM

## 2025-06-23 DIAGNOSIS — I25.5 ISCHEMIC DILATED CARDIOMYOPATHY (HCC): ICD-10-CM

## 2025-06-23 DIAGNOSIS — Z86.711 HX OF PULMONARY EMBOLUS: ICD-10-CM

## 2025-06-23 DIAGNOSIS — I42.0 ISCHEMIC DILATED CARDIOMYOPATHY (HCC): ICD-10-CM

## 2025-06-23 PROCEDURE — 3074F SYST BP LT 130 MM HG: CPT | Performed by: PHYSICIAN ASSISTANT

## 2025-06-23 PROCEDURE — 99204 OFFICE O/P NEW MOD 45 MIN: CPT | Performed by: PHYSICIAN ASSISTANT

## 2025-06-23 PROCEDURE — 99213 OFFICE O/P EST LOW 20 MIN: CPT | Performed by: PHYSICIAN ASSISTANT

## 2025-06-23 PROCEDURE — 3078F DIAST BP <80 MM HG: CPT | Performed by: PHYSICIAN ASSISTANT

## 2025-06-23 RX ORDER — LOSARTAN POTASSIUM 25 MG/1
12.5 TABLET ORAL EVERY EVENING
Qty: 45 TABLET | Refills: 3 | Status: SHIPPED | OUTPATIENT
Start: 2025-06-23

## 2025-06-23 RX ORDER — CLOPIDOGREL BISULFATE 75 MG/1
75 TABLET ORAL DAILY
Qty: 90 TABLET | Refills: 3 | Status: SHIPPED | OUTPATIENT
Start: 2025-06-23

## 2025-06-23 RX ORDER — METOPROLOL SUCCINATE 25 MG/1
25 TABLET, EXTENDED RELEASE ORAL DAILY
Qty: 90 TABLET | Refills: 3 | Status: SHIPPED | OUTPATIENT
Start: 2025-06-23

## 2025-06-23 RX ORDER — ATORVASTATIN CALCIUM 40 MG/1
40 TABLET, FILM COATED ORAL EVERY EVENING
Qty: 90 TABLET | Refills: 3 | Status: SHIPPED | OUTPATIENT
Start: 2025-06-23

## 2025-06-23 ASSESSMENT — ENCOUNTER SYMPTOMS
ORTHOPNEA: 0
SHORTNESS OF BREATH: 0
COUGH: 0
LOSS OF CONSCIOUSNESS: 0
PND: 0
DIZZINESS: 1
PALPITATIONS: 0

## 2025-06-23 ASSESSMENT — FIBROSIS 4 INDEX: FIB4 SCORE: 17.81

## 2025-06-23 NOTE — PATIENT INSTRUCTIONS
Goal LDL is less than 70, closer to 55.     You are on losartan for kidney and heart benefits (not to treat blood pressure). If you are having the fuzzy feeling after 2 more weeks of taking the pill you can hold the pill for 2 weeks and assess how you feel after the pill.

## 2025-06-23 NOTE — PROGRESS NOTES
Chief Complaint   Patient presents with    Coronary Artery Disease     Follow up           Subjective:   Hemant Martínez is a 70 y.o. male who presents today for hospital follow-up.     Initially seen in the hospital by Dr. Brenner.  He presented [facility]25 to [facility] with inferior STEMI.  Emergent heart catheterization performed showed occluded codominant left circumflex status post successful IVUS guided PCI with overlapping Synergy drug-eluting stents.  He had residual severe disease in the mid LAD.  His residual disease was successfully treated during this hospitalization with 2 overlapping Synergy drug-eluting stents to the mid LAD.  His echocardiogram performed this admission showed an LV systolic function of 45-50% with global hypokinesis, akinesis of the mid to basal anterolateral wall.  Moderate MR, moderate TR.    Other medical history includes carcinoma of the left kidney status post nephrectomy, hyperlipidemia, history of recurrent DVT/PE on Xarelto.    Hemant presents to the clinic with his wife for hospital follow-up.  He is doing well since his discharge.  He is not experiencing chest discomfort, shortness of breath, orthopnea, PND.  He is tolerating his medications without any bleeding complications.  He stopped the aspirin as instructed after 2 weeks.  He continues on the full-strength Xarelto and clopidogrel.  He has some low blood pressure readings at home with systolics in the 100s.  He has occasional lightheadedness sometimes when exercising.  He is back to daily exercise with at least 15 minutes on the stationary bike and hiking at a moderate pace for 30 to 60 minutes.    His mother  of a brain aneurysm at age 50.  There is no known coronary artery disease in his family.  He is a never smoker.   Past Medical History[1]      No family history on file.      Social History[2]      Allergies[3]      Current Outpatient Medications   Medication Sig    atorvastatin (LIPITOR) 40 MG Tab Take 1 Tablet by  mouth every evening.    clopidogrel (PLAVIX) 75 MG Tab Take 1 Tablet by mouth every day.    losartan (COZAAR) 25 MG Tab Take 0.5 Tablets by mouth every evening.    metoprolol SR (TOPROL XL) 25 MG TABLET SR 24 HR Take 1 Tablet by mouth every day.    rivaroxaban (XARELTO) 20 MG Tab tablet Take 1 Tablet by mouth with dinner.    sildenafil citrate (VIAGRA) 25 MG Tab Take 25 mg by mouth 1 time a day as needed for Erectile Dysfunction.    allopurinol (ZYLOPRIM) 300 MG Tab Take 300 mg by mouth every morning.         Review of Systems   Respiratory:  Negative for cough and shortness of breath.    Cardiovascular:  Negative for chest pain, palpitations, orthopnea, leg swelling and PND.   Neurological:  Positive for dizziness. Negative for loss of consciousness.          Objective:   /72 (BP Location: Left arm, Patient Position: Sitting)   Pulse 84   Resp 16   Ht 1.829 m (6')   Wt 84.8 kg (187 lb)   SpO2 98%  Body mass index is 25.36 kg/m².         Physical Exam  Vitals reviewed.   HENT:      Head: Normocephalic and atraumatic.   Cardiovascular:      Rate and Rhythm: Normal rate and regular rhythm.   Pulmonary:      Effort: Pulmonary effort is normal. No respiratory distress.      Breath sounds: No rales.   Musculoskeletal:      Right lower leg: No edema.      Left lower leg: No edema.      Comments: Right wrist is soft without hematoma or active bleeding. Distal radial pulse is 2+. Normal range of motion and sensation of right hand.    Skin:     General: Skin is warm.   Neurological:      General: No focal deficit present.      Mental Status: He is alert.      Gait: Gait normal.   Psychiatric:         Judgment: Judgment normal.         TTE 6/6/25  CONCLUSIONS  No prior study is available for comparison.   The left ventricular ejection fraction is visually estimated to be 45-  50%.  Global hypokinesis with regional variation.  Moderate mitral regurgitation.  Moderate tricuspid regurgitation.  Estimated right  ventricular systolic pressure is 67 mmHg.       LHC/CAG #1 6/6/25  IMPRESSION:  1.  Occluded codominant left circumflex status post successful IVUS guided PCI deploying overlapping Synergy 4 x 12 mm, 3 x 28 mm and 2.5 x 12 mm Evelin.  Heavy plaque burden noted on imaging  2.  Residual severe mid LAD disease, CIPRIANO-3 flow  3.  Reduced estimated LVEF 40% with elevated resting LVEDP 25 mmHg no significant transaortic gradient on pullback    LHC/CAG #2  IMPRESSION:  Successful cutting balloon angioplasty and percutaneous coronary invention to the proximal to mid left anterior descending coronary artery with overlapping 3.0 x 38 mm and 2.5 x 24 mm Synergy drug-eluting stents postdilated to 3.6 mm proximally.  Widely patent left circumflex coronary artery stents.  Normal left heart filling pressures.  Assessment:     1. ST elevation myocardial infarction involving left circumflex coronary artery (HCC)  atorvastatin (LIPITOR) 40 MG Tab    clopidogrel (PLAVIX) 75 MG Tab    losartan (COZAAR) 25 MG Tab    metoprolol SR (TOPROL XL) 25 MG TABLET SR 24 HR      2. Dyslipidemia  Lipid Profile    Lipoprotein (a)      3. Stage 3a chronic kidney disease  Comp Metabolic Panel      4. Ischemic dilated cardiomyopathy (HCC)        5. Hx of pulmonary embolus        6. Thrombocytopenia (HCC)             Medical Decision Making:  Today's Assessment / Plan:   STEMI 6/6/26  CAD  S/p overlapping stents to culprit left circumflex  S/p staged PCI to mid LAD with overlapping stents  Ischemic cardiomyopathy    He has recovered well from his hospitalization and is not experiencing any angina.  He is back to moderate activity with mildly symptomatic hypotension.  If he does not equilibrate after another 2 weeks he can hold the losartan for 2 weeks and reassess his blood pressures and symptoms    - Continue clopidogrel 75 mg and Xarelto 20 mg.  See below  - Continue high at intensity atorvastatin 40 mg.  Repeat lipid profile  - Continue metoprolol  succinate 25 mg  - Continue losartan 12.5 mg.    Status post nephrectomy  CKD stage 3A  - Continue losartan 12.5 mg if blood pressure tolerates this    History of DVT/PE  - Multiple recurrences although it sounds like these were provoked.  He has been on Xarelto 10 mg in the past and now increased to 20 mg since his hospitalization.  Will recheck his renal function, calculate creatinine clearance and adjust Xarelto if needed.  Continue 20 mg along with clopidogrel 75 mg for 12 months from stenting then can de-escalate to preventative dose    Moderate MR, TR  - echo in June 2026    Return in about 3 months (around 9/23/2025) for Dr. Brenner.  Sooner if problems.  I personally spent a total of 50 minutes which includes face-to-face time and non-face-to-face time spent on preparing to see the patient, reviewing hospital notes and tests, obtaining history from the patient, performing a medically appropriate exam, counseling and educating the patient, ordering medications/tests/procedures/referrals as clinically indicated, and documenting information in the electronic medical record.         [1]   Past Medical History:  Diagnosis Date    Presence of drug coated stent in left circumflex coronary artery - culprite STEMI 6/2025 06/06/2025    ST elevation myocardial infarction involving left circumflex coronary artery (HCC) 06/06/2025   [2]   Social History  Tobacco Use    Smoking status: Never    Smokeless tobacco: Never   Substance Use Topics    Alcohol use: Yes     Comment: 2-3 weekly   [3]   Allergies  Allergen Reactions    Penicillin G Unspecified     Unknown reaction as a child

## 2025-07-29 LAB
CHOLEST SERPL-MCNC: 119 MG/DL
HDLC SERPL-MCNC: 46 MG/DL
LDLC SERPL CALC-MCNC: 60 MG/DL
TRIGL SERPL-MCNC: 67 MG/DL

## 2025-07-30 DIAGNOSIS — E78.5 DYSLIPIDEMIA: Chronic | ICD-10-CM

## 2025-07-31 ENCOUNTER — RESULTS FOLLOW-UP (OUTPATIENT)
Dept: CARDIOLOGY | Facility: MEDICAL CENTER | Age: 70
End: 2025-07-31
Payer: MEDICARE

## 2025-08-05 ENCOUNTER — TELEPHONE (OUTPATIENT)
Dept: CARDIOLOGY | Facility: MEDICAL CENTER | Age: 70
End: 2025-08-05
Payer: MEDICARE

## 2025-08-05 DIAGNOSIS — E78.5 DYSLIPIDEMIA: Chronic | ICD-10-CM
